# Patient Record
Sex: FEMALE | Race: WHITE | Employment: OTHER | URBAN - NONMETROPOLITAN AREA
[De-identification: names, ages, dates, MRNs, and addresses within clinical notes are randomized per-mention and may not be internally consistent; named-entity substitution may affect disease eponyms.]

---

## 2021-08-12 ENCOUNTER — HOSPITAL ENCOUNTER (OUTPATIENT)
Dept: OCCUPATIONAL THERAPY | Age: 76
Setting detail: THERAPIES SERIES
Discharge: HOME | End: 2021-08-12
Payer: MEDICARE

## 2021-08-12 PROCEDURE — 97165 OT EVAL LOW COMPLEX 30 MIN: CPT

## 2021-08-12 NOTE — PROGRESS NOTES
[address]  DRIVING EVALUATION    PATIENT: Jose Montgomery  YOB: 1945  GENDER:  female  CSN: [id number]   REFERRING PHYSICIAN:   JULIA Canales PA-C  DIAGNOSIS:  Altered mental status  DRIVING HISTORY:    Patient reports not having driven since April of this year. Diagnosed with a brain aneurysm per patient and  confirmed. Normally would drive 1x/wk in the area where she lives, occasionally to [facility].  is the principal  between the two. PMH: Please see medical history questionnaire for past medical history, allergies, and medications. OBJECTIVE  VISUAL SKILLS(using the [employer] Dream Heat 2000 Visual Evaluator)       FAR VISUAL ACUITY:   Pass. 20/40 R and L with glasses     STEREO DEPTH:   Pass. FUSION:    Pass. PERIPHERAL VISION:  Pass. Identified bilaterally at 55 and 70 degree angles       DYNAVISION TESTIN hits in 60 second self paced trial. [facility]. PHYSICAL SKILLS  RANGE OF MOTION:Within functional limits for driving  STRENGTH: Within functional limits for driving  TRANSFER IN/OUT OF SIMULATOR: Within functional limits for driving  AMBULATION: Within functional limits for driving    IN VEHICLE MANIPULATION SKILLS:  Steering Wheel:Within functional limits for driving   Gas Pedal:  Within functional limits for driving  Brake Pedal: Within functional limits for driving  Turn Signal: Not tested  Seat Belt: Not tested     COGNITIVE SKILLS  ORIENTATION:  Impaired: Oriented to month, stated year was . ATTENTION SPAN:Within functional limits for driving  FRUSTRATION TOLERANCE:Within functional limits for driving  IMPULSIVITY:Within functional limits for driving  DIRECTION FOLLOWING:Impaired: difficulty with higher than 1 step instruction. R/L DISCRIMINATION:Impaired: had occasional difficulty with verbalizing L/R.  MEMORY:Impaired: Short and long term memory impairment noted.   JUDGMENT: Within functional limits for driving    COGNITIVE TESTING:     SHORT BLESSED TEST:   The Short Blessed Test is a screening tool to assess orientation, short term memory, long term memory, and reversal of learning. Overall this patient received a score of  Ó10 or more which indicates a cognitive impairment. Patient demonstrated a score of 18, with difficulties noted with orientation, short term recall and reversal of learning. Trail Making Test A - Impaired. Unable to complete test in required time of  78 seconds. Required 93 seconds to complete. Clock Drawing Test - 1/4. SIMULATED DRIVING ASSESSMENT:  WARM-UP:    (54 MPH, [address] with several curves. Light on-coming traffic. There are no intersections, pedestrians, cross traffic, slow traffic, etc.)    Total off road crashes: 0 (Good performance is 0)   Total collisions with vehicles and roadway objects: 0 (Good performance is 0)   Percentage of time over the posted speed limit: 0 (Good performance is within +/- 5% of the posted speed limit.)   Percentage of time out of lanes: 0 (Good performance would be less than 5%, moderate is less than 10%, greater than 10% is considered poor.)      BRAKE REACTION TIME:  (The brake reaction time test consists of presenting a large stop sign in the center of the visual display.  The appropriate response if for the  to release the gas and apply the brakes as quickly as possible.)     Total pedal reaction time: 1 second (Average value is below 0.7 seconds.)   Gas pedal reaction time: 0.65 seconds (For good performance, this should be less than 0.4 seconds; poor performance is above 0.55 seconds)   Stopping distance: 185 feet (Good performance is less than 175 feet, moderate is between 175 and 220 feet, greater than 220 feet is considered poor.)   Speed at stimulus: 50 MPH        ASSESSMENT AND PLAN    RESULTS: Patient tested unsafe to return to independent driving    RECOMMENDATIONS:   No driving at this time due to diminished cognition including executive function, concentration, and short term memory. Patient has been instructed to contact referring physician to discuss results of this evaluation. Patient has been informed that his or her physician is responsible for making the final decision regarding driving status. Thank you for this referral.    History: Low Complexity: brief history completed. Reviewed medical and therapy records related to presenting problem    Performance Deficits/Examination: Low Complexity: 1-2 performance deficits related to presenting problem that result in activity limitations or participation restrictions. Deficits include: diminished cognitive ability. Clinical Decision Making: Clinical Decision making was of Low Complexity as the result of analysis of data from a problem focused assessment, a consideration of a limited number of treatment options, no significant comorbidities affecting the plan of care and no modification or assistance required to complete the evaluation. Evaluation Complexity: Based on the findings of patient history, examination, clinical presentation, and decision making during this evaluation, this patient is of low complexity.     Time in: 0920  Time out: 1015  Timed treatment: 0  Total time: 55 minutes          Pollo SANCHEZ/MUKUL, [place] #[id number]

## 2023-05-18 ENCOUNTER — HOSPITAL ENCOUNTER (OUTPATIENT)
Dept: MRI IMAGING | Age: 78
Discharge: HOME | End: 2023-05-18
Payer: MEDICARE

## 2023-05-18 DIAGNOSIS — I67.1 CEREBRAL ANEURYSM, NONRUPTURED: ICD-10-CM

## 2023-05-18 LAB — POC CREATININE WHOLE BLOOD: 0.7 MG/DL (ref 0.5–1.2)

## 2023-05-18 PROCEDURE — 70553 MRI BRAIN STEM W/O & W/DYE: CPT

## 2023-05-18 PROCEDURE — 70546 MR ANGIOGRAPH HEAD W/O&W/DYE: CPT

## 2023-05-18 PROCEDURE — 82565 ASSAY OF CREATININE: CPT

## 2023-05-18 PROCEDURE — A9579 GAD-BASE MR CONTRAST NOS,1ML: HCPCS | Performed by: NEUROLOGICAL SURGERY

## 2023-05-18 PROCEDURE — 6360000004 HC RX CONTRAST MEDICATION: Performed by: NEUROLOGICAL SURGERY

## 2023-05-18 RX ADMIN — GADOTERIDOL 10 ML: 279.3 INJECTION, SOLUTION INTRAVENOUS at 17:23

## 2023-06-05 ENCOUNTER — TELEPHONE (OUTPATIENT)
Dept: FAMILY MEDICINE CLINIC | Age: 78
End: 2023-06-05

## 2023-06-06 NOTE — TELEPHONE ENCOUNTER
I called the patient's spouse and let him know that Dr. Alexx Shaikh did receive the imaging (MRI and MRA) but has not received any notes from JULIA Flores as of yet. Patient's spouse stated he will be getting a hold of Dr. Margoth Earl office to have them send over their notes.

## 2023-06-06 NOTE — TELEPHONE ENCOUNTER
sent me a message in his own chart. Please let him know that I do have results of imaging (MRI and MRA) but I do not have the note from Dr. Matthew House.

## 2023-06-07 NOTE — TELEPHONE ENCOUNTER
Thank you! He sent another message in his chart. He is concerned about BP elevation when Gilbert Owens is agitated (given the carotid aneurysm). Would it be helpful to have a visit (in person or video visit) to discuss her blood pressures and her medications and her mood? We may be able to improve things a bit.

## 2023-06-07 NOTE — TELEPHONE ENCOUNTER
Called and spoke to patient's spouse, he stated Berta Marco [place] has an appointment coming up in September and he will monitor Vannesa's BP and if there are any issues or concerns then he will message Dr. Natasha Holman on myChart unless Dr. Destini Person thinks the patient should be seen sooner.

## 2023-06-08 ENCOUNTER — TELEPHONE (OUTPATIENT)
Dept: FAMILY MEDICINE CLINIC | Age: 78
End: 2023-06-08

## 2023-06-08 NOTE — TELEPHONE ENCOUNTER
Note from :    Dr Evita Yan,  Here is the average of three BP readings of Justin Funez. Diastolic: 224, Systolic 77, Pulse 85  Device used is a [employer] automated sphygmomanometer   Thank you for your follow up. Royce for Justin Funez    I sent message back that this is a good average.

## 2023-09-14 ENCOUNTER — OFFICE VISIT (OUTPATIENT)
Dept: FAMILY MEDICINE CLINIC | Age: 78
End: 2023-09-14
Payer: MEDICARE

## 2023-09-14 VITALS
BODY MASS INDEX: 29.39 KG/M2 | OXYGEN SATURATION: 97 % | HEART RATE: 76 BPM | WEIGHT: 145.8 LBS | SYSTOLIC BLOOD PRESSURE: 124 MMHG | DIASTOLIC BLOOD PRESSURE: 84 MMHG | HEIGHT: 59 IN

## 2023-09-14 DIAGNOSIS — Z91.81 AT HIGH RISK FOR FALLS: ICD-10-CM

## 2023-09-14 DIAGNOSIS — F03.B0 MODERATE DEMENTIA WITHOUT BEHAVIORAL DISTURBANCE, PSYCHOTIC DISTURBANCE, MOOD DISTURBANCE, OR ANXIETY, UNSPECIFIED DEMENTIA TYPE (HCC): Primary | ICD-10-CM

## 2023-09-14 PROCEDURE — 99213 OFFICE O/P EST LOW 20 MIN: CPT | Performed by: FAMILY MEDICINE

## 2023-09-14 PROCEDURE — 1123F ACP DISCUSS/DSCN MKR DOCD: CPT | Performed by: FAMILY MEDICINE

## 2023-09-14 RX ORDER — MEMANTINE HYDROCHLORIDE 5 MG/1
TABLET ORAL
COMMUNITY
Start: 2023-08-25

## 2023-09-14 RX ORDER — DONEPEZIL HYDROCHLORIDE 23 MG/1
TABLET, FILM COATED ORAL
COMMUNITY
Start: 2023-08-18

## 2023-09-14 RX ORDER — ACETAMINOPHEN 325 MG/1
325 TABLET ORAL DAILY
COMMUNITY

## 2023-09-14 SDOH — ECONOMIC STABILITY: HOUSING INSECURITY
IN THE LAST 12 MONTHS, WAS THERE A TIME WHEN YOU DID NOT HAVE A STEADY PLACE TO SLEEP OR SLEPT IN A SHELTER (INCLUDING NOW)?: NO

## 2023-09-14 SDOH — ECONOMIC STABILITY: FOOD INSECURITY: WITHIN THE PAST 12 MONTHS, YOU WORRIED THAT YOUR FOOD WOULD RUN OUT BEFORE YOU GOT MONEY TO BUY MORE.: NEVER TRUE

## 2023-09-14 SDOH — ECONOMIC STABILITY: FOOD INSECURITY: WITHIN THE PAST 12 MONTHS, THE FOOD YOU BOUGHT JUST DIDN'T LAST AND YOU DIDN'T HAVE MONEY TO GET MORE.: NEVER TRUE

## 2023-09-14 SDOH — ECONOMIC STABILITY: INCOME INSECURITY: HOW HARD IS IT FOR YOU TO PAY FOR THE VERY BASICS LIKE FOOD, HOUSING, MEDICAL CARE, AND HEATING?: NOT HARD AT ALL

## 2023-09-14 ASSESSMENT — ENCOUNTER SYMPTOMS
BACK PAIN: 1
TROUBLE SWALLOWING: 0
CONSTIPATION: 1
ABDOMINAL PAIN: 0
SHORTNESS OF BREATH: 0

## 2023-09-14 ASSESSMENT — PATIENT HEALTH QUESTIONNAIRE - PHQ9
SUM OF ALL RESPONSES TO PHQ9 QUESTIONS 1 & 2: 0
SUM OF ALL RESPONSES TO PHQ QUESTIONS 1-9: 0
2. FEELING DOWN, DEPRESSED OR HOPELESS: 0
SUM OF ALL RESPONSES TO PHQ QUESTIONS 1-9: 0
1. LITTLE INTEREST OR PLEASURE IN DOING THINGS: 0

## 2023-09-14 NOTE — PROGRESS NOTES
clearly but difficulty finding the correct words to convey intended meaning. No tremor. Psychiatric:         Mood and Affect: Mood normal.          No results found for any visits on 09/14/23. An electronic signature was used to authenticate this note.     --Preeti Mayo MD

## 2023-09-14 NOTE — PATIENT INSTRUCTIONS
Have labs prior to next visit. Drink plenty of water so it's not hard to get the blood sample. Try Blue Emu or diclofenac gel for the right arm pain.

## 2023-10-06 ENCOUNTER — TELEPHONE (OUTPATIENT)
Dept: FAMILY MEDICINE CLINIC | Age: 78
End: 2023-10-06

## 2023-10-09 NOTE — TELEPHONE ENCOUNTER
Certainly can consider cscope, but I recommend they try the change in the fiber dosing first to see if that regulates her BM's. If desired, we can refer to GI for further discussion of cscope. Her other conditions, especially her memory loss, need to be factored in to that decision.
I called and spoke to the pt's spouse Lia Johnson. Lia Johnson stated the pt has a hx of breast cancer first dx in 2016, Lia Johnson also states the pt has a family history (mother) of Liver, esophageal and Lower GI cancer. Lia Johnson stated the pt's last colonoscopy was 10+ years ago. Lia Johnson also stated the patient is taking metamucil fiber every other day, I let him know that Dr. Lashonda Aleman wants the pt to increase and continue daily and to have an adequate intake of water daily. Nitesh stated understanding!
Spoke to patients . Stated understanding.
Spoke with patient . States that patient has had recent bowl changes. Was doing better with fiber supplement. Then had some diarrhea one day. Then back again. Patient is wondering with hx of cancer if she should have a colonoscopy done.
When was last cscope? Hx of breast cancer, not colon cancer, correct? Still taking the fiber daily? If so, increase the dose and continue daily. Encourage adequate water intake.
negative...

## 2023-12-01 ENCOUNTER — TELEPHONE (OUTPATIENT)
Dept: FAMILY MEDICINE CLINIC | Age: 78
End: 2023-12-01

## 2023-12-01 NOTE — TELEPHONE ENCOUNTER
Pt's spouse called stating he believes the pt has a UTI, urine is cloudy, has a strong odor and frequency has increased and urgency to go, is confused more than normal and is going through her depends more. Alicia Vernon say's there is no pain or bleeding. Would like to know if it is okay to take her to the [facility] there in [place] on Monday?

## 2023-12-04 ENCOUNTER — OFFICE VISIT (OUTPATIENT)
Dept: FAMILY MEDICINE CLINIC | Age: 78
End: 2023-12-04
Payer: MEDICARE

## 2023-12-04 ENCOUNTER — TELEPHONE (OUTPATIENT)
Dept: FAMILY MEDICINE CLINIC | Age: 78
End: 2023-12-04

## 2023-12-04 VITALS
HEIGHT: 59 IN | SYSTOLIC BLOOD PRESSURE: 118 MMHG | WEIGHT: 147 LBS | BODY MASS INDEX: 29.64 KG/M2 | RESPIRATION RATE: 15 BRPM | OXYGEN SATURATION: 98 % | DIASTOLIC BLOOD PRESSURE: 68 MMHG | HEART RATE: 71 BPM

## 2023-12-04 DIAGNOSIS — R41.82 ALTERED MENTAL STATUS, UNSPECIFIED ALTERED MENTAL STATUS TYPE: Primary | ICD-10-CM

## 2023-12-04 DIAGNOSIS — R35.0 URINARY FREQUENCY: Primary | ICD-10-CM

## 2023-12-04 DIAGNOSIS — R41.82 ALTERED MENTAL STATUS, UNSPECIFIED ALTERED MENTAL STATUS TYPE: ICD-10-CM

## 2023-12-04 DIAGNOSIS — R35.0 URINARY FREQUENCY: ICD-10-CM

## 2023-12-04 PROBLEM — L90.5 SCAR CONDITIONS AND FIBROSIS OF SKIN: Status: ACTIVE | Noted: 2022-08-11

## 2023-12-04 PROBLEM — D05.12 INTRADUCTAL CARCINOMA IN SITU OF LEFT BREAST: Status: ACTIVE | Noted: 2023-12-04

## 2023-12-04 PROBLEM — R94.31 ABNORMAL ELECTROCARDIOGRAPHY: Status: ACTIVE | Noted: 2018-04-06

## 2023-12-04 PROBLEM — I49.8 ATRIAL ARRHYTHMIA: Status: ACTIVE | Noted: 2018-04-06

## 2023-12-04 PROBLEM — L73.8 OTHER SPECIFIED FOLLICULAR DISORDERS: Status: ACTIVE | Noted: 2022-02-11

## 2023-12-04 PROBLEM — R01.1 CARDIAC MURMUR, UNSPECIFIED: Status: ACTIVE | Noted: 2018-04-06

## 2023-12-04 PROBLEM — L82.1 OTHER SEBORRHEIC KERATOSIS: Status: ACTIVE | Noted: 2022-08-11

## 2023-12-04 PROBLEM — M85.80 OSTEOPENIA: Status: ACTIVE | Noted: 2021-05-25

## 2023-12-04 PROBLEM — L30.4 ERYTHEMA INTERTRIGO: Status: ACTIVE | Noted: 2022-08-11

## 2023-12-04 PROBLEM — I72.0 ANEURYSM OF CAROTID ARTERY (HCC): Status: ACTIVE | Noted: 2021-05-24

## 2023-12-04 PROBLEM — I67.1 INTRACRANIAL ANEURYSM: Status: ACTIVE | Noted: 2021-05-19

## 2023-12-04 PROBLEM — Z85.828 PERSONAL HISTORY OF OTHER MALIGNANT NEOPLASM OF SKIN: Status: ACTIVE | Noted: 2022-02-11

## 2023-12-04 PROCEDURE — 1123F ACP DISCUSS/DSCN MKR DOCD: CPT | Performed by: STUDENT IN AN ORGANIZED HEALTH CARE EDUCATION/TRAINING PROGRAM

## 2023-12-04 PROCEDURE — 99213 OFFICE O/P EST LOW 20 MIN: CPT | Performed by: STUDENT IN AN ORGANIZED HEALTH CARE EDUCATION/TRAINING PROGRAM

## 2023-12-04 RX ORDER — NITROFURANTOIN 25; 75 MG/1; MG/1
100 CAPSULE ORAL 2 TIMES DAILY
Qty: 10 CAPSULE | Refills: 0 | Status: SHIPPED | OUTPATIENT
Start: 2023-12-04 | End: 2023-12-09

## 2023-12-04 ASSESSMENT — ENCOUNTER SYMPTOMS
SINUS PAIN: 0
DIARRHEA: 0
RHINORRHEA: 0
COUGH: 0
SINUS PRESSURE: 0
COLOR CHANGE: 0
NAUSEA: 0
VOMITING: 0
SHORTNESS OF BREATH: 0
CONSTIPATION: 0
SORE THROAT: 0

## 2023-12-04 NOTE — TELEPHONE ENCOUNTER
Please let Mercy Dutton know that it is definitely a good idea to have urine checked in advance of appointment Thursday. And confirm that he is comfortable waiting until Thursday to have Shana Sowmya seen? I could see her tomorrow if he prefers. I will put in order for UA and cx.   Hope it is the correct order for collection at [facility] :)

## 2023-12-04 NOTE — PROGRESS NOTES
[address] [address] [address] Belén Pompa is a 69 y.o. female who presents today for:  Chief Complaint   Patient presents with    Urinary Tract Infection     Patients  states he has noticed symptoms of, strong order, cloudiness, increased frequency, urgency and confusion. Thinks it started about 1-2 weeks ago. Has been taking cranberry capsule        Assessment/Plan:     Dario Siddiqui was seen today for urinary tract infection. Diagnoses and all orders for this visit:    Urinary frequency  -     nitrofurantoin, macrocrystal-monohydrate, (MACROBID) 100 MG capsule; Take 1 capsule by mouth 2 times daily for 5 days  -     Urinalysis with Reflex to Culture    Altered mental status, unspecified altered mental status type  -     Urinalysis with Reflex to Culture      UTI symptoms with increased confusion, no red flag symptoms. Will treat with Macrobid as above, additionally has order placed for urinalysis with reflex culture and has been plans to bring in urine sample when able to obtain. No follow-ups on file. Medications Prescribed:  Orders Placed This Encounter   Medications    nitrofurantoin, macrocrystal-monohydrate, (MACROBID) 100 MG capsule     Sig: Take 1 capsule by mouth 2 times daily for 5 days     Dispense:  10 capsule     Refill:  0       Future Appointments   Date Time Provider [address] [place] [address]   12/7/2023  2:30 PM [facility]ee   3/12/2024  2:30 PM MD LESLEY Edouard [facility] Dee       HPI:     HPI  71-year-old female with past medical history significant for aneurysm of carotid artery, arrhythmia, murmur, dementia who presents for evaluation of UTI. Patient's  states that he has no symptoms of strong odor, cloudiness, increased frequency, urgency, and increased overall confusion for patient.     He thinks it may have started 1 to 2

## 2023-12-04 NOTE — TELEPHONE ENCOUNTER
I called and spoke with Jensen Lewis, he had Tyrone Torres at the [facility] and was trying to give a urine sample. Jensen Lewis had Dr. Parks Said get on the phone with me and she let me know that they currently could not get a urine sample from the pt but will treat the pt as if she has a UTI and stated Jensen Lewis can still take Tyrone Torres to have a urine done and if the pt is still showing symptoms of a UTI on Thursday then treatment will have already been started! Jensen Lewis will bring Tyrone Torres in to be seen with Dr. Catrachita Inman on Thursday 12/7/2023.

## 2023-12-04 NOTE — TELEPHONE ENCOUNTER
----- Message from Ramiro Murphy sent at 12/4/2023  9:26 AM EST -----  Subject: Message to Provider    QUESTIONS  Information for Provider? Patient spouse Lien Parker would like if clinical staff   could call back in regards if he should take patient to walk in clinic for   urine test to have before Thursday's visit  ---------------------------------------------------------------------------  --------------  Vahid Antimony Alexander  [phone]; OK to leave message on voicemail  ---------------------------------------------------------------------------  --------------  SCRIPT ANSWERS  Relationship to Patient? Spouse/Partner  Representative Name? Munira Andrews   Is the representative on the Communication Release of Information (JING)   form in Epic?  Yes

## 2023-12-05 LAB
BACTERIA URNS QL MICRO: ABNORMAL /HPF
BILIRUB UR QL STRIP.AUTO: NEGATIVE
CASTS #/AREA URNS LPF: ABNORMAL /LPF
CASTS 2: ABNORMAL /LPF
CHARACTER UR: CLEAR
COLOR: YELLOW
CRYSTALS URNS MICRO: ABNORMAL
EPITHELIAL CELLS, UA: ABNORMAL /HPF
GLUCOSE UR QL STRIP.AUTO: NEGATIVE MG/DL
HGB UR QL STRIP.AUTO: NEGATIVE
KETONES UR QL STRIP.AUTO: NEGATIVE
MISCELLANEOUS 2: ABNORMAL
NITRITE UR QL STRIP: POSITIVE
PH UR STRIP.AUTO: 6 [PH] (ref 5–9)
PROT UR STRIP.AUTO-MCNC: NEGATIVE MG/DL
RBC URINE: ABNORMAL /HPF
RENAL EPI CELLS #/AREA URNS HPF: ABNORMAL /[HPF]
SP GR UR REFRACT.AUTO: 1.02 (ref 1–1.03)
UROBILINOGEN, URINE: 0.2 EU/DL (ref 0–1)
WBC #/AREA URNS HPF: > 200 /HPF
WBC #/AREA URNS HPF: ABNORMAL /[HPF]
YEAST LIKE FUNGI URNS QL MICRO: ABNORMAL

## 2023-12-06 LAB
BACTERIA UR CULT: ABNORMAL
ORGANISM: ABNORMAL

## 2023-12-07 ENCOUNTER — OFFICE VISIT (OUTPATIENT)
Dept: FAMILY MEDICINE CLINIC | Age: 78
End: 2023-12-07
Payer: MEDICARE

## 2023-12-07 VITALS
DIASTOLIC BLOOD PRESSURE: 72 MMHG | BODY MASS INDEX: 30 KG/M2 | OXYGEN SATURATION: 97 % | WEIGHT: 148.8 LBS | TEMPERATURE: 98.4 F | HEART RATE: 66 BPM | SYSTOLIC BLOOD PRESSURE: 108 MMHG | HEIGHT: 59 IN

## 2023-12-07 DIAGNOSIS — K06.8 PAIN IN GUMS: ICD-10-CM

## 2023-12-07 DIAGNOSIS — N30.00 ACUTE CYSTITIS WITHOUT HEMATURIA: Primary | ICD-10-CM

## 2023-12-07 PROCEDURE — 1123F ACP DISCUSS/DSCN MKR DOCD: CPT | Performed by: FAMILY MEDICINE

## 2023-12-07 PROCEDURE — 99212 OFFICE O/P EST SF 10 MIN: CPT | Performed by: FAMILY MEDICINE

## 2023-12-07 NOTE — PROGRESS NOTES
97%     Physical Exam  Vitals reviewed. Constitutional:       Appearance: Normal appearance. Eyes:      Conjunctiva/sclera: Conjunctivae normal.   Cardiovascular:      Rate and Rhythm: Normal rate and regular rhythm. Heart sounds: Murmur (I/VI systolic murmur) heard. Pulmonary:      Effort: Pulmonary effort is normal.      Breath sounds: Normal breath sounds. No wheezing, rhonchi or rales. Abdominal:      General: Abdomen is flat. Palpations: Abdomen is soft. There is no mass. Tenderness: There is no abdominal tenderness. There is no guarding or rebound. Musculoskeletal:      Right lower leg: No edema. Left lower leg: No edema. Lymphadenopathy:      Cervical: No cervical adenopathy. Neurological:      Mental Status: She is alert. Comments: Disoriented. Speech clear. Gums without erythema or swelling. No oral thrush. No results found for any visits on 12/07/23. An electronic signature was used to authenticate this note.     --Edwina Johnston MD

## 2023-12-27 ENCOUNTER — OFFICE VISIT (OUTPATIENT)
Dept: FAMILY MEDICINE CLINIC | Age: 78
End: 2023-12-27
Payer: MEDICARE

## 2023-12-27 ENCOUNTER — TELEPHONE (OUTPATIENT)
Dept: FAMILY MEDICINE CLINIC | Age: 78
End: 2023-12-27

## 2023-12-27 VITALS
RESPIRATION RATE: 17 BRPM | HEIGHT: 59 IN | WEIGHT: 147 LBS | TEMPERATURE: 97.7 F | SYSTOLIC BLOOD PRESSURE: 124 MMHG | HEART RATE: 73 BPM | OXYGEN SATURATION: 95 % | BODY MASS INDEX: 29.64 KG/M2 | DIASTOLIC BLOOD PRESSURE: 86 MMHG

## 2023-12-27 DIAGNOSIS — R09.81 CONGESTION OF NASAL SINUS: Primary | ICD-10-CM

## 2023-12-27 DIAGNOSIS — U07.1 COVID: ICD-10-CM

## 2023-12-27 PROBLEM — F03.90 DEMENTIA (HCC): Status: ACTIVE | Noted: 2023-12-27

## 2023-12-27 LAB
Lab: ABNORMAL
QC PASS/FAIL: ABNORMAL
SARS-COV-2 RDRP RESP QL NAA+PROBE: POSITIVE

## 2023-12-27 PROCEDURE — 1123F ACP DISCUSS/DSCN MKR DOCD: CPT | Performed by: STUDENT IN AN ORGANIZED HEALTH CARE EDUCATION/TRAINING PROGRAM

## 2023-12-27 PROCEDURE — 99214 OFFICE O/P EST MOD 30 MIN: CPT | Performed by: STUDENT IN AN ORGANIZED HEALTH CARE EDUCATION/TRAINING PROGRAM

## 2023-12-27 PROCEDURE — 87635 SARS-COV-2 COVID-19 AMP PRB: CPT | Performed by: STUDENT IN AN ORGANIZED HEALTH CARE EDUCATION/TRAINING PROGRAM

## 2023-12-27 NOTE — PROGRESS NOTES
[address] [facility]  [address] Belén Lopez is a 69 y.o. female who presents today for:  Chief Complaint   Patient presents with    Congestion     Runny nose for 3 days, today has been congested, SOB  Denies fevers. Assessment/Plan:     Jeremie Angela was seen today for congestion. Diagnoses and all orders for this visit:    Congestion of nasal sinus  -     POCT COVID-19 Rapid, NAAT    COVID  -     nirmatrelvir/ritonavir 300/100 (PAXLOVID) 20 x 150 MG & 10 x 100MG TBPK; Take 3 tablets (two 150 mg nirmatrelvir and one 100 mg ritonavir tablets) by mouth every 12 hours for 5 days. COVID-positive, Paxlovid as above otherwise recommendations as below. Current CDC guidelines recommend isolation 5 days after the onset of symptoms. If symptoms improving at that time can return to work with mask for additional 5 days. If symptoms not improving at that time should remain isolated for 5 additional days. Please get pulse oximeter. If shortness of breath worsens or if oxygen is consistently below 90%, please go to the ED for further evaluation. Symptomatic therapy suggested: gargle for sore throat, use mist at bedside for congestion. Apply facial warm packs for sinus pain. Recommend increased hydration, small frequent meals, and resting when able. For cough/chest congestion either Mucinex DM or Robitussin DM, take dose as recommended on bottle. For drainage/post nasal drip Flonase 2 puffs per nostril nightly for two weeks. No follow-ups on file. Medications Prescribed:  Orders Placed This Encounter   Medications    nirmatrelvir/ritonavir 300/100 (PAXLOVID) 20 x 150 MG & 10 x 100MG TBPK     Sig: Take 3 tablets (two 150 mg nirmatrelvir and one 100 mg ritonavir tablets) by mouth every 12 hours for 5 days.      Dispense:  30 tablet     Refill:  0     Order Specific Question:   Does this

## 2023-12-27 NOTE — PATIENT INSTRUCTIONS
Current CDC guidelines recommend isolation 5 days after the onset of symptoms. If symptoms improving at that time can return to work with mask for additional 5 days. If symptoms not improving at that time should remain isolated for 5 additional days. Please get pulse oximeter. If shortness of breath worsens or if oxygen is consistently below 90%, please go to the ED for further evaluation. Symptomatic therapy suggested: gargle for sore throat, use mist at bedside for congestion. Apply facial warm packs for sinus pain. Recommend increased hydration, small frequent meals, and resting when able. For cough/chest congestion either Mucinex DM or Robitussin DM, take dose as recommended on bottle. For drainage/post nasal drip Flonase 2 puffs per nostril nightly for two weeks.

## 2023-12-27 NOTE — TELEPHONE ENCOUNTER
Patients EC called in stating, patient is having some difficulty breathing with this congestion and cough she has. In formed patient  Is not in today, and the schedule is full, Informed patients EC that she should be evaluated today, informed patient to do to UC or ER. Spoke with yue agrees with this. Spoke with patients  is seeing UC in [place].

## 2024-03-12 ENCOUNTER — OFFICE VISIT (OUTPATIENT)
Dept: FAMILY MEDICINE CLINIC | Age: 79
End: 2024-03-12
Payer: MEDICARE

## 2024-03-12 ENCOUNTER — NURSE ONLY (OUTPATIENT)
Dept: LAB | Age: 79
End: 2024-03-12

## 2024-03-12 VITALS
HEART RATE: 74 BPM | HEIGHT: 59 IN | OXYGEN SATURATION: 100 % | WEIGHT: 150 LBS | BODY MASS INDEX: 30.24 KG/M2 | DIASTOLIC BLOOD PRESSURE: 78 MMHG | SYSTOLIC BLOOD PRESSURE: 118 MMHG

## 2024-03-12 DIAGNOSIS — F03.90 DEMENTIA WITHOUT BEHAVIORAL DISTURBANCE, PSYCHOTIC DISTURBANCE, MOOD DISTURBANCE, OR ANXIETY, UNSPECIFIED DEMENTIA SEVERITY, UNSPECIFIED DEMENTIA TYPE (HCC): ICD-10-CM

## 2024-03-12 DIAGNOSIS — M85.80 OSTEOPENIA, UNSPECIFIED LOCATION: ICD-10-CM

## 2024-03-12 DIAGNOSIS — Z00.00 WELLNESS EXAMINATION: Primary | ICD-10-CM

## 2024-03-12 LAB
25(OH)D3 SERPL-MCNC: 38 NG/ML (ref 30–100)
ALBUMIN SERPL BCG-MCNC: 4 G/DL (ref 3.5–5.1)
ALP SERPL-CCNC: 119 U/L (ref 38–126)
ALT SERPL W/O P-5'-P-CCNC: 21 U/L (ref 11–66)
ANION GAP SERPL CALC-SCNC: 12 MEQ/L (ref 8–16)
AST SERPL-CCNC: 21 U/L (ref 5–40)
BASOPHILS ABSOLUTE: 0 THOU/MM3 (ref 0–0.1)
BASOPHILS NFR BLD AUTO: 0.8 %
BILIRUB SERPL-MCNC: 0.2 MG/DL (ref 0.3–1.2)
BUN SERPL-MCNC: 17 MG/DL (ref 7–22)
CALCIUM SERPL-MCNC: 9.5 MG/DL (ref 8.5–10.5)
CHLORIDE SERPL-SCNC: 100 MEQ/L (ref 98–111)
CO2 SERPL-SCNC: 25 MEQ/L (ref 23–33)
CREAT SERPL-MCNC: 0.6 MG/DL (ref 0.4–1.2)
DEPRECATED RDW RBC AUTO: 47.8 FL (ref 35–45)
EOSINOPHIL NFR BLD AUTO: 1.8 %
EOSINOPHILS ABSOLUTE: 0.1 THOU/MM3 (ref 0–0.4)
ERYTHROCYTE [DISTWIDTH] IN BLOOD BY AUTOMATED COUNT: 13.5 % (ref 11.5–14.5)
GFR SERPL CREATININE-BSD FRML MDRD: > 60 ML/MIN/1.73M2
GLUCOSE SERPL-MCNC: 110 MG/DL (ref 70–108)
HCT VFR BLD AUTO: 44.4 % (ref 37–47)
HGB BLD-MCNC: 14.2 GM/DL (ref 12–16)
IMM GRANULOCYTES # BLD AUTO: 0.03 THOU/MM3 (ref 0–0.07)
IMM GRANULOCYTES NFR BLD AUTO: 0.5 %
LYMPHOCYTES ABSOLUTE: 1.6 THOU/MM3 (ref 1–4.8)
LYMPHOCYTES NFR BLD AUTO: 25.5 %
MCH RBC QN AUTO: 30.6 PG (ref 26–33)
MCHC RBC AUTO-ENTMCNC: 32 GM/DL (ref 32.2–35.5)
MCV RBC AUTO: 95.7 FL (ref 81–99)
MONOCYTES ABSOLUTE: 0.6 THOU/MM3 (ref 0.4–1.3)
MONOCYTES NFR BLD AUTO: 10.1 %
NEUTROPHILS NFR BLD AUTO: 61.3 %
NRBC BLD AUTO-RTO: 0 /100 WBC
PLATELET # BLD AUTO: 256 THOU/MM3 (ref 130–400)
PMV BLD AUTO: 9.7 FL (ref 9.4–12.4)
POTASSIUM SERPL-SCNC: 4.2 MEQ/L (ref 3.5–5.2)
PROT SERPL-MCNC: 6.8 G/DL (ref 6.1–8)
RBC # BLD AUTO: 4.64 MILL/MM3 (ref 4.2–5.4)
SEGMENTED NEUTROPHILS ABSOLUTE COUNT: 3.8 THOU/MM3 (ref 1.8–7.7)
SODIUM SERPL-SCNC: 137 MEQ/L (ref 135–145)
TSH SERPL DL<=0.005 MIU/L-ACNC: 0.57 UIU/ML (ref 0.4–4.2)
WBC # BLD AUTO: 6.2 THOU/MM3 (ref 4.8–10.8)

## 2024-03-12 PROCEDURE — G0439 PPPS, SUBSEQ VISIT: HCPCS | Performed by: FAMILY MEDICINE

## 2024-03-12 PROCEDURE — 1123F ACP DISCUSS/DSCN MKR DOCD: CPT | Performed by: FAMILY MEDICINE

## 2024-03-12 ASSESSMENT — PATIENT HEALTH QUESTIONNAIRE - PHQ9
SUM OF ALL RESPONSES TO PHQ QUESTIONS 1-9: 0
SUM OF ALL RESPONSES TO PHQ QUESTIONS 1-9: 0
2. FEELING DOWN, DEPRESSED OR HOPELESS: 0
SUM OF ALL RESPONSES TO PHQ QUESTIONS 1-9: 0
SUM OF ALL RESPONSES TO PHQ QUESTIONS 1-9: 0
SUM OF ALL RESPONSES TO PHQ9 QUESTIONS 1 & 2: 0
1. LITTLE INTEREST OR PLEASURE IN DOING THINGS: 0

## 2024-03-12 NOTE — PROGRESS NOTES
[facility] PROFESSIONAL SERVS  [facility]  [address] [facility] [id number] [phone]    Vannesa Haas (:  1945) is a 78 y.o. female, here for evaluation of the following chief complaint(s):  Medication Check (Pt here for routine med check.  Does have new concerns for \"spitting up\" lately.   states after pt eats, she will sometimes spit up phlegm.  Has happened twice in the past few days.  Neurologist, CAILIN Mccallum did increase Namenda to twice daily. )           ASSESSMENT/PLAN:  1. Wellness examination  Gradually progressive cognitive decline.  Has living will.   assisting with ADL's, IADL's.  Has fallen.  Reviewed wellness info under AW[facility] rooming tab.  Gave printed info.  Recommended COVID booster.  Due for Prevnar 20 in .    2. Dementia without behavioral disturbance, psychotic disturbance, mood disturbance, or anxiety, unspecified dementia severity, unspecified dementia type (HCC)  Managed by neurologist.  Reviewed note from visit 2023.  - Comprehensive Metabolic Panel; Future  - CBC with Auto Differential; Future  - TSH with Reflex; Future  - Vitamin D 25 Hydroxy; Future    3. Osteopenia, unspecified location   reports normal heel u/s at [facility].  DXA  showed osteopenia.  Check vitamin D level.  Consider repeat DXA -  wants to wait for now.  We can discuss at next visit.    - Comprehensive Metabolic Panel; Future  - CBC with Auto Differential; Future  - TSH with Reflex; Future  - Vitamin D 25 Hydroxy; Future    4. Gagging and vomiting phlegm  My concern is dysphagia and aspiration, but the history is not classic.  Further testing if sx persist/worsen.      Return in about 6 months (around 2024).       Patient Instructions   Pneumonia vaccine due .    Go ahead and get the COVID booster.  Have labs checked today.      Subjective   SUBJECTIVE/OBJECTIVE:  Some gagging and regurgitation

## 2024-03-13 ENCOUNTER — TELEPHONE (OUTPATIENT)
Dept: FAMILY MEDICINE CLINIC | Age: 79
End: 2024-03-13

## 2024-03-13 NOTE — PROGRESS NOTES
Flor message 3/13/24 -  sent in his chart.    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  Not much content lots of phlegm again.  Royce     I replied asking if she had any gagging or if she had eaten or drank just before the episode.  Awaiting an answer.  Consider swallowing evaluation.

## 2024-03-13 NOTE — TELEPHONE ENCOUNTER
----- Message from Ellen Dasilva MD sent at 3/13/2024  1:16 PM EDT -----  Please let  know that Vannesa's vitamin D level is at the low end of normal.  I would recommend a daily vitamin D supplement (1000IU daily).  The rest of her labs are fine.

## 2024-03-13 NOTE — TELEPHONE ENCOUNTER
I called and spoke to the patient's EC, Isma. I let him know Dr. Ellen Dasilva's response regarding the patient's lab results. He voiced understanding.

## 2024-03-14 ENCOUNTER — TELEPHONE (OUTPATIENT)
Dept: FAMILY MEDICINE CLINIC | Age: 79
End: 2024-03-14

## 2024-03-14 NOTE — TELEPHONE ENCOUNTER
Mr. Haas is sending [employer] messages through his chart about Vannesa.  Can we help him get proxy access to her mychart?      Here is the latest message, a response to a message from yesterday:    \"No cough yes she had breakfast. We ate lunch  salad. We just finished a walk. She can not find the 1/2 bath . I have to tell her every time.   She talks gibberish to be and gets angry when I don’t answer!\"    Please let him know the following from me - I think we should talk about a swallowing study, but I would recommend we include Elizabeth in the conversation.  The biggest question in my mind is whether the results of the study would help in any way.  They might, by letting us know what is wrong, but if the issue is with swallowing, the options for improving that are somewhat limited.  Should we set up a video visit and include Elizabeth?    Please let him know that I appreciate his patience with Vannesa - I am sure it is so frustrating having to show her the same things over and over and having her get upset when she can't make herself understood.  It is hard on both of them.  Would he want to be part of a caregiver support group if we can identify one locally?

## 2024-03-14 NOTE — TELEPHONE ENCOUNTER
Called patients  and LMOM to call the office. I did find him in the system and added him to her my chart for now.

## 2024-06-06 ENCOUNTER — NURSE ONLY (OUTPATIENT)
Dept: FAMILY MEDICINE CLINIC | Age: 79
End: 2024-06-06

## 2024-06-06 ENCOUNTER — TELEPHONE (OUTPATIENT)
Dept: FAMILY MEDICINE CLINIC | Age: 79
End: 2024-06-06

## 2024-06-06 DIAGNOSIS — R30.0 DYSURIA: Primary | ICD-10-CM

## 2024-06-06 DIAGNOSIS — R30.0 DYSURIA: ICD-10-CM

## 2024-06-06 LAB
BACTERIA: ABNORMAL
BILIRUB UR QL STRIP: NEGATIVE
CASTS #/AREA URNS LPF: ABNORMAL /LPF
CASTS #/AREA URNS LPF: ABNORMAL /LPF
CHARACTER UR: ABNORMAL
CHARCOAL URNS QL MICRO: ABNORMAL
COLOR UR: YELLOW
CRYSTALS URNS QL MICRO: ABNORMAL
EPITHELIAL CELLS, UA: ABNORMAL /HPF
GLUCOSE UR QL STRIP.AUTO: NEGATIVE MG/DL
HGB UR QL STRIP.AUTO: ABNORMAL
KETONES UR QL STRIP.AUTO: NEGATIVE
LEUKOCYTE ESTERASE UR QL STRIP.AUTO: ABNORMAL
NITRITE UR QL STRIP.AUTO: POSITIVE
PH UR STRIP.AUTO: 6.5 [PH] (ref 5–9)
PROT UR STRIP.AUTO-MCNC: ABNORMAL MG/DL
RBC #/AREA URNS HPF: ABNORMAL /HPF
RENAL EPI CELLS #/AREA URNS HPF: ABNORMAL /[HPF]
SPECIFIC GRAVITY UA: 1.02 (ref 1–1.03)
UROBILINOGEN, URINE: 0.2 EU/DL (ref 0–1)
WBC #/AREA URNS HPF: > 200 /HPF
YEAST LIKE FUNGI URNS QL MICRO: ABNORMAL

## 2024-06-06 NOTE — TELEPHONE ENCOUNTER
The patient's EC, Isma called and said that he spoke to Ellen this morning about the patient. He would like for Dr. Dasilva to put in an order for a urine test and they will have it done at [facility] in [place]. He would like a call back when this has been completed.   [Parents] : parents [Medical Records] : medical records

## 2024-06-06 NOTE — TELEPHONE ENCOUNTER
I called and spoke to the patient's EC, Isma. I let him know that Dr. Ellen Dasilva put the order in. He voiced understanding.

## 2024-06-07 ENCOUNTER — TELEPHONE (OUTPATIENT)
Dept: FAMILY MEDICINE CLINIC | Age: 79
End: 2024-06-07

## 2024-06-07 DIAGNOSIS — N30.00 ACUTE CYSTITIS WITHOUT HEMATURIA: Primary | ICD-10-CM

## 2024-06-07 RX ORDER — NITROFURANTOIN 25; 75 MG/1; MG/1
100 CAPSULE ORAL 2 TIMES DAILY
Qty: 10 CAPSULE | Refills: 0 | Status: SHIPPED | OUTPATIENT
Start: 2024-06-07 | End: 2024-06-12

## 2024-06-07 NOTE — TELEPHONE ENCOUNTER
I called the patient's EC, Isma and received voicemail. I left a detailed message letting him know Dr. Ellen Dasilva's response regarding the urine results and that the medication was sent to [facility] in [place], [place] and if he has any questions or concerns he can give the office a call back.

## 2024-06-07 NOTE — TELEPHONE ENCOUNTER
I copy and pasted Marquita's response regarding the patient's lab results and sent them to the patient via My Chart.

## 2024-06-07 NOTE — TELEPHONE ENCOUNTER
----- Message from Ellen Dasilva MD sent at 6/7/2024  8:06 AM EDT -----    Please let  Royce know that urine does indicate infection.  I will send antibiotic to pharmacy.  Thank you!

## 2024-06-07 NOTE — RESULT ENCOUNTER NOTE
Please let  Royce know that urine does indicate infection.  I will send antibiotic to pharmacy.  Thank you!

## 2024-06-12 ENCOUNTER — HOSPITAL ENCOUNTER (OUTPATIENT)
Dept: MRI IMAGING | Age: 79
Discharge: HOME | End: 2024-06-12
Payer: MEDICARE

## 2024-06-12 DIAGNOSIS — I67.1 CEREBRAL ANEURYSM, NONRUPTURED: ICD-10-CM

## 2024-06-12 LAB — POC CREATININE WHOLE BLOOD: 0.8 MG/DL (ref 0.5–1.2)

## 2024-06-12 PROCEDURE — 70546 MR ANGIOGRAPH HEAD W/O&W/DYE: CPT

## 2024-06-12 PROCEDURE — 82565 ASSAY OF CREATININE: CPT

## 2024-06-12 PROCEDURE — 70553 MRI BRAIN STEM W/O & W/DYE: CPT

## 2024-06-12 PROCEDURE — 6360000004 HC RX CONTRAST MEDICATION: Performed by: NEUROLOGICAL SURGERY

## 2024-06-12 PROCEDURE — A9579 GAD-BASE MR CONTRAST NOS,1ML: HCPCS | Performed by: NEUROLOGICAL SURGERY

## 2024-06-12 RX ADMIN — GADOTERIDOL 15 ML: 279.3 INJECTION, SOLUTION INTRAVENOUS at 19:01

## 2024-08-22 ENCOUNTER — TELEPHONE (OUTPATIENT)
Dept: FAMILY MEDICINE CLINIC | Age: 79
End: 2024-08-22

## 2024-08-22 ENCOUNTER — LAB (OUTPATIENT)
Dept: FAMILY MEDICINE CLINIC | Age: 79
End: 2024-08-22

## 2024-08-22 DIAGNOSIS — R39.9 UTI SYMPTOMS: ICD-10-CM

## 2024-08-22 DIAGNOSIS — R39.9 UTI SYMPTOMS: Primary | ICD-10-CM

## 2024-08-22 LAB
BACTERIA: ABNORMAL
BILIRUB UR QL STRIP: NEGATIVE
CASTS #/AREA URNS LPF: ABNORMAL /LPF
CASTS #/AREA URNS LPF: ABNORMAL /LPF
CHARACTER UR: ABNORMAL
CHARCOAL URNS QL MICRO: ABNORMAL
COLOR UR: YELLOW
CRYSTALS URNS QL MICRO: ABNORMAL
EPITHELIAL CELLS, UA: ABNORMAL /HPF
GLUCOSE UR QL STRIP.AUTO: NEGATIVE MG/DL
HGB UR QL STRIP.AUTO: NEGATIVE
KETONES UR QL STRIP.AUTO: NEGATIVE
LEUKOCYTE ESTERASE UR QL STRIP.AUTO: ABNORMAL
NITRITE UR QL STRIP.AUTO: NEGATIVE
PH UR STRIP.AUTO: 6.5 [PH] (ref 5–9)
PROT UR STRIP.AUTO-MCNC: NEGATIVE MG/DL
RBC #/AREA URNS HPF: ABNORMAL /HPF
RENAL EPI CELLS #/AREA URNS HPF: ABNORMAL /[HPF]
SPECIFIC GRAVITY UA: 1.02 (ref 1–1.03)
UROBILINOGEN, URINE: 0.2 EU/DL (ref 0–1)
WBC #/AREA URNS HPF: ABNORMAL /HPF
YEAST LIKE FUNGI URNS QL MICRO: ABNORMAL

## 2024-08-22 NOTE — TELEPHONE ENCOUNTER
Patient ROSALIND Isma called in stating he believe patient has a uti. Would like and order placed to have a urine test done. Would like to have that done at [place].

## 2024-08-22 NOTE — TELEPHONE ENCOUNTER
I called Isma to let him know Dr. Dasilva placed orders for Vannesa to have urine tested to see if she has a UTI or not. No answer left a detailed VM!

## 2024-08-23 ENCOUNTER — TELEPHONE (OUTPATIENT)
Dept: FAMILY MEDICINE CLINIC | Age: 79
End: 2024-08-23

## 2024-08-23 NOTE — TELEPHONE ENCOUNTER
----- Message from Dr. Ellen Dasilva MD sent at 8/23/2024  8:47 AM EDT -----  Please let  know that initial urine test does not look very suspicious for infection, but we will have more definitive results by Monday (culture is in progress).  If she needs evaluated for her symptoms in the mean time, we may be able to get her an appointment in the [facility] office.  Thanks!  AKILA

## 2024-08-23 NOTE — TELEPHONE ENCOUNTER
I called Royce back and let him know that Dr. Dasilva stated she would prefer to wait on sending in any prescriptions until she has the culture results. Royce stated understanding and will wait.

## 2024-08-23 NOTE — TELEPHONE ENCOUNTER
Given the relatively normal result, I would prefer to wait on a prescription until we have the culture result.

## 2024-08-23 NOTE — TELEPHONE ENCOUNTER
I called Royce and let him know Dr. Dasilva's response to the urine test results. Royce stated understanding and will wait until Monday to see what the cultures say. He is asking if Dr. Dasilva feels a prescription should be called in for Vannesa, if so he would like the prescription sent to [facility] in [place] please!

## 2024-08-24 LAB
BACTERIA UR CULT: ABNORMAL
ORGANISM: ABNORMAL

## 2024-08-26 ENCOUNTER — TELEPHONE (OUTPATIENT)
Dept: FAMILY MEDICINE CLINIC | Age: 79
End: 2024-08-26

## 2024-08-26 NOTE — TELEPHONE ENCOUNTER
I copy and pasted the response regarding the patient's urine results and sent them to the patient via My Chart.

## 2024-08-26 NOTE — TELEPHONE ENCOUNTER
----- Message from Dr. Ellen Dasilva MD sent at 8/26/2024 12:58 PM EDT -----  Please let Mr. Haas know that urine culture did not show infection.  I can see Vannesa in the office if needed to discuss any symptoms of concern.

## 2024-08-28 ENCOUNTER — TELEPHONE (OUTPATIENT)
Dept: FAMILY MEDICINE CLINIC | Age: 79
End: 2024-08-28

## 2024-08-28 NOTE — TELEPHONE ENCOUNTER
Isma called the office and scheduled Vannesa to be seen with Dr. Dasilva on 09/05/2024 and would like if any lab work that needs to be done be put in prior to Vannesa's appointment and he'll have Vannesa get them done in [place].

## 2024-08-29 NOTE — TELEPHONE ENCOUNTER
Vannesa had normal basic labs 3/2024.  I would prefer to see her and decide at the visit if other labs would be useful depending on her current status and concerns.  Please let Royce know.  Thanks!

## 2024-08-29 NOTE — TELEPHONE ENCOUNTER
I called Isma to let him know Dr. Dasilva's response regarding the requested lab orders. No answer left a detailed VM!

## 2024-09-05 ENCOUNTER — OFFICE VISIT (OUTPATIENT)
Dept: FAMILY MEDICINE CLINIC | Age: 79
End: 2024-09-05
Payer: MEDICARE

## 2024-09-05 VITALS
BODY MASS INDEX: 30.86 KG/M2 | DIASTOLIC BLOOD PRESSURE: 84 MMHG | HEART RATE: 75 BPM | SYSTOLIC BLOOD PRESSURE: 122 MMHG | WEIGHT: 152.8 LBS | OXYGEN SATURATION: 95 %

## 2024-09-05 DIAGNOSIS — R60.0 EDEMA OF LEFT LOWER EXTREMITY: ICD-10-CM

## 2024-09-05 DIAGNOSIS — R73.03 PRE-DIABETES: ICD-10-CM

## 2024-09-05 DIAGNOSIS — F03.B0 MODERATE DEMENTIA WITHOUT BEHAVIORAL DISTURBANCE, PSYCHOTIC DISTURBANCE, MOOD DISTURBANCE, OR ANXIETY, UNSPECIFIED DEMENTIA TYPE (HCC): ICD-10-CM

## 2024-09-05 DIAGNOSIS — N39.41 URGE INCONTINENCE OF URINE: Primary | ICD-10-CM

## 2024-09-05 DIAGNOSIS — N32.81 OVERACTIVE BLADDER: ICD-10-CM

## 2024-09-05 PROBLEM — D05.12 INTRADUCTAL CARCINOMA IN SITU OF LEFT BREAST: Status: RESOLVED | Noted: 2023-12-04 | Resolved: 2024-09-05

## 2024-09-05 PROBLEM — L90.5 SCAR CONDITIONS AND FIBROSIS OF SKIN: Status: RESOLVED | Noted: 2022-08-11 | Resolved: 2024-09-05

## 2024-09-05 PROBLEM — L30.4 ERYTHEMA INTERTRIGO: Status: RESOLVED | Noted: 2022-08-11 | Resolved: 2024-09-05

## 2024-09-05 PROBLEM — R94.31 ABNORMAL ELECTROCARDIOGRAPHY: Status: RESOLVED | Noted: 2018-04-06 | Resolved: 2024-09-05

## 2024-09-05 PROCEDURE — 1123F ACP DISCUSS/DSCN MKR DOCD: CPT | Performed by: FAMILY MEDICINE

## 2024-09-05 PROCEDURE — 99214 OFFICE O/P EST MOD 30 MIN: CPT | Performed by: FAMILY MEDICINE

## 2024-09-05 RX ORDER — ASPIRIN/CALCIUM/MAG/ALUMINUM 325 MG
325 TABLET ORAL DAILY
COMMUNITY
End: 2024-09-06 | Stop reason: ALTCHOICE

## 2024-09-05 RX ORDER — VIT C/B6/B5/MAGNESIUM/HERB 173 50-5-6-5MG
CAPSULE ORAL DAILY
COMMUNITY

## 2024-09-05 RX ORDER — MIRABEGRON 25 MG/1
25 TABLET, FILM COATED, EXTENDED RELEASE ORAL DAILY
Qty: 30 TABLET | Refills: 0 | Status: SHIPPED | OUTPATIENT
Start: 2024-09-05

## 2024-09-05 NOTE — PROGRESS NOTES
[facility] PROFESSIONAL SERVS  [facility]  [address] [id number] [phone]    Zina Haas (:  1945) is a 79 y.o. female, here for evaluation of the following chief complaint(s):  6 Month Follow-Up        Assessment & Plan   ASSESSMENT/PLAN:  There are no diagnoses linked to this encounter.    No follow-ups on file.       There are no Patient Instructions on file for this visit.    Subjective   SUBJECTIVE/OBJECTIVE:  HPI    Review of Systems     Health Maintenance Due   Topic Date Due   • Hepatitis C screen  Never done   • Respiratory Syncytial Virus (RSV) Pregnant or age 60 yrs+ (1 - 1-dose 60+ series) Never done   • Shingles vaccine (2 of 3) 10/01/2009   • Flu vaccine (1) 2024   • COVID-19 Vaccine ( season) 2024       Objective   Vitals:    24 1324   BP: 122/84   Pulse: 75   SpO2: 95%     Physical Exam     No results found for any visits on 24.          {Time Documentation Optional:[id number]}      An electronic signature was used to authenticate this note.    --Ellen Dasilva MD     
  [facility]  [address] [facility] [id number] [phone]    Zina Haas (:  1945) is a 79 y.o. female, here for evaluation of the following chief complaint(s):  6 Month Follow-Up        Assessment & Plan   ASSESSMENT/PLAN:  1. Urge incontinence of urine  Recent urine cx neg.  Trial mirabegron.  Avoid anticholinergic meds due to co-morbid dementia.    - mirabegron (MYRBETRIQ) 25 MG TB24; Take 1 tablet by mouth daily  Dispense: 30 tablet; Refill: 0  - CBC with Auto Differential; Future  - Comprehensive Metabolic Panel; Future  - TSH with Reflex; Future  - Hemoglobin A1C; Future    2. Overactive bladder  As above.  - mirabegron (MYRBETRIQ) 25 MG TB24; Take 1 tablet by mouth daily  Dispense: 30 tablet; Refill: 0  - CBC with Auto Differential; Future  - Comprehensive Metabolic Panel; Future  - TSH with Reflex; Future  - Hemoglobin A1C; Future    3. Edema of left lower extremity  Concern for DVT.  Check u/s.  - Vascular duplex lower extremity venous left; Future  - CBC with Auto Differential; Future  - Comprehensive Metabolic Panel; Future  - TSH with Reflex; Future  - Hemoglobin A1C; Future    4. Moderate dementia without behavioral disturbance, psychotic disturbance, mood disturbance, or anxiety, unspecified dementia type (HCC)  Continued gradual decline.  Continue current meds.  Continue day program.  - CBC with Auto Differential; Future  - Comprehensive Metabolic Panel; Future  - TSH with Reflex; Future  - Hemoglobin A1C; Future    5. Pre-diabetes  Check A1C with next labs.  - Hemoglobin A1C; Future      Follow up  with labs prior to visit.         There are no Patient Instructions on file for this visit.    Subjective   SUBJECTIVE/OBJECTIVE:  Management visit.  Overall doing about the same as previous.  No falls.  No sig issues with swallowing, vomiting.  Urinary frequency.  Sig urinary incontinence - ?urge incontinence. Up 
Oriented - self; Oriented - place; Oriented - time

## 2024-09-06 DIAGNOSIS — I82.412 ACUTE DEEP VEIN THROMBOSIS (DVT) OF FEMORAL VEIN OF LEFT LOWER EXTREMITY (HCC): Primary | ICD-10-CM

## 2024-09-06 RX ORDER — MULTIVIT-MIN/IRON/FOLIC ACID/K 18-600-40
CAPSULE ORAL DAILY
COMMUNITY

## 2024-09-06 NOTE — PROGRESS NOTES
Received call from u/s dept at [facility].  Pt with acute dvt left mid superficial femoral vein extending to popliteal vein and one peroneal vein.  Partly compressible.    Spoke with .  Already took asa today.  Go ahead with apixaban anticoagulation.  Discontinue aspirin as of tomorrow.  Elevate LE.  Discussed need for at least 3 months of anticoagulation - likely longer as this appears unprovoked.    Plan follow up visit in 2-4 wks, sooner if needed.

## 2024-10-23 ENCOUNTER — TELEPHONE (OUTPATIENT)
Dept: FAMILY MEDICINE CLINIC | Age: 79
End: 2024-10-23

## 2024-10-23 NOTE — TELEPHONE ENCOUNTER
stopped in.  It has been 6 weeks from previous Vascular test and he is wondering if it is time to have another one.  If so, an order needs placed and faxed to [facility] so patient can have done at [facility].  Please advise.  Thank you.

## 2024-10-23 NOTE — TELEPHONE ENCOUNTER
I prefer to see her and evaluate - she may not need a repeat u/s if we can tell clinically that the clot has resolved.  Also important to see her to evaluate for any adverse effects from the blood thinner and to discuss timing for discontinuation.    Thanks!  AKILA

## 2024-10-30 ENCOUNTER — OFFICE VISIT (OUTPATIENT)
Dept: FAMILY MEDICINE CLINIC | Age: 79
End: 2024-10-30

## 2024-10-30 VITALS
HEART RATE: 70 BPM | OXYGEN SATURATION: 98 % | SYSTOLIC BLOOD PRESSURE: 126 MMHG | RESPIRATION RATE: 16 BRPM | BODY MASS INDEX: 31.39 KG/M2 | DIASTOLIC BLOOD PRESSURE: 78 MMHG | WEIGHT: 155.4 LBS

## 2024-10-30 DIAGNOSIS — R39.9 UTI SYMPTOMS: Primary | ICD-10-CM

## 2024-10-30 DIAGNOSIS — N30.00 ACUTE CYSTITIS WITHOUT HEMATURIA: ICD-10-CM

## 2024-10-30 LAB
BILIRUBIN, POC: NEGATIVE
BLOOD URINE, POC: NEGATIVE
CLARITY, POC: NORMAL
COLOR, POC: NORMAL
GLUCOSE URINE, POC: NEGATIVE MG/DL
KETONES, POC: NEGATIVE MG/DL
LEUKOCYTE EST, POC: NORMAL
NITRITE, POC: NEGATIVE
PH, POC: 7
PROTEIN, POC: NEGATIVE MG/DL
SPECIFIC GRAVITY, POC: 1.01
UROBILINOGEN, POC: 0.2 MG/DL

## 2024-10-30 RX ORDER — NITROFURANTOIN 25; 75 MG/1; MG/1
100 CAPSULE ORAL 2 TIMES DAILY
Qty: 10 CAPSULE | Refills: 0 | Status: SHIPPED | OUTPATIENT
Start: 2024-10-30 | End: 2024-11-04

## 2024-10-30 SDOH — ECONOMIC STABILITY: INCOME INSECURITY: HOW HARD IS IT FOR YOU TO PAY FOR THE VERY BASICS LIKE FOOD, HOUSING, MEDICAL CARE, AND HEATING?: NOT HARD AT ALL

## 2024-10-30 SDOH — ECONOMIC STABILITY: FOOD INSECURITY: WITHIN THE PAST 12 MONTHS, THE FOOD YOU BOUGHT JUST DIDN'T LAST AND YOU DIDN'T HAVE MONEY TO GET MORE.: NEVER TRUE

## 2024-10-30 SDOH — ECONOMIC STABILITY: FOOD INSECURITY: WITHIN THE PAST 12 MONTHS, YOU WORRIED THAT YOUR FOOD WOULD RUN OUT BEFORE YOU GOT MONEY TO BUY MORE.: NEVER TRUE

## 2024-10-30 ASSESSMENT — ENCOUNTER SYMPTOMS
COUGH: 0
VOMITING: 0
CONSTIPATION: 0
DIARRHEA: 1
NAUSEA: 0
SHORTNESS OF BREATH: 0

## 2024-10-30 NOTE — PROGRESS NOTES
Bowel sounds are normal. There is no distension.      Palpations: Abdomen is soft.      Tenderness: There is no abdominal tenderness. There is no right CVA tenderness, left CVA tenderness or guarding.   Neurological:      Mental Status: She is alert.           Patient given educational materials - see patient instructions.  Discussed use, benefit, and sideeffects of prescribed medications.  All patient questions answered.  Pt voiced understanding. Reviewed health maintenance.  Instructed to continue current medications, diet and exercise.  Patient agreed with treatment plan.Follow up as directed.     Electronically signed by Elda Mcneill MD on 10/30/2024 at 3:06 PM

## 2024-11-01 LAB
BACTERIA UR CULT: ABNORMAL
ORGANISM: ABNORMAL

## 2024-11-05 ENCOUNTER — TELEPHONE (OUTPATIENT)
Dept: FAMILY MEDICINE CLINIC | Age: 79
End: 2024-11-05

## 2024-11-05 ENCOUNTER — OFFICE VISIT (OUTPATIENT)
Dept: FAMILY MEDICINE CLINIC | Age: 79
End: 2024-11-05

## 2024-11-05 VITALS
BODY MASS INDEX: 31.41 KG/M2 | HEIGHT: 59 IN | WEIGHT: 155.8 LBS | SYSTOLIC BLOOD PRESSURE: 136 MMHG | DIASTOLIC BLOOD PRESSURE: 70 MMHG | OXYGEN SATURATION: 94 % | HEART RATE: 75 BPM

## 2024-11-05 DIAGNOSIS — R60.0 EDEMA OF LEFT LOWER EXTREMITY: ICD-10-CM

## 2024-11-05 DIAGNOSIS — I82.4Y2 DVT, LOWER EXTREMITY, PROXIMAL, ACUTE, LEFT (HCC): Primary | ICD-10-CM

## 2024-11-05 DIAGNOSIS — R19.7 DIARRHEA, UNSPECIFIED TYPE: ICD-10-CM

## 2024-11-05 NOTE — PROGRESS NOTES
[facility] PROFESSIONAL SERVS  [facility]  [address] [address]  [phone]    Zina Haas (:  1945) is a 79 y.o. female, here for evaluation of the following chief complaint(s):  Follow-up (Here to f/u for blood cots that were present in Lt leg,  states there were 3.  Has been on Eliquis since clots were initially found.)        Assessment & Plan     ASSESSMENT/PLAN:  1. DVT, lower extremity, proximal, acute, left (HCC)  Unprovoked blood clot.  The patient's condition was thoroughly discussed, including the risks and benefits of anticoagulation therapy. The current guidelines for managing unprovoked blood clots were also reviewed. Continuation of Eliquis 5 mg twice a day for a minimum of 3 months was recommended. Will likely continue indefinitely since clot was unprovoked.  She was advised that it is okay to take Eliquis at bedtime and does not need to be taken with food. No signs of abnormal bleeding were reported.     2. Diarrhea, unspecified type  The patient has experienced diarrhea, which may be related to recent antibiotic use. She was advised to add a fiber supplement such as Metamucil, Benefiber, or Citrucel to her diet to help manage her symptoms. She was instructed to start with a small amount to avoid bloating or gas.      3. Urinary Tract Infection (UTI).  The patient completed her antibiotic course with the last pill taken yesterday. Symptoms of dark urine and odor have subsided. No further antibiotics are needed at this time.    4. Weight gain.  The patient's weight has been gradually increasing. She was advised to monitor her diet and try to maintain her current weight to avoid additional strain on her knee.    5. Medication Management.  The patient's Namenda dosage was recently increased by her neurologist to 20 mg daily, taken as 10 mg in the morning and 10 mg at night. She was advised that there is no conflict between

## 2024-11-05 NOTE — TELEPHONE ENCOUNTER
----- Message from Dr. Elda Mcneill MD sent at 11/4/2024  8:03 AM EST -----  Please let patient's  know that she was on the right antibiotic to treat her infection. Please let us know if other questions, otherwise she has appointment with Dr. Dasilva tomorrow.

## 2024-11-05 NOTE — PATIENT INSTRUCTIONS
For loose, urgent stools:  start with 1/4-1/2 recommended dose over the counter fiber supplement.  Use daily.  Drink plenty of water.  Gradually increase dose every 3 days or so until stools are formed.  Benefiber, Metamucil, Citracel are some options

## 2025-01-20 NOTE — TELEPHONE ENCOUNTER
calling in to get short supply of eliquis until follow up appointment with you on 2/6/25    Has only til wed morning left     Patient's last appointment was : 11/5/2024  Patient's next appointment is : 2/6/2025  Last refilled: 11/5/24

## 2025-01-23 ENCOUNTER — OFFICE VISIT (OUTPATIENT)
Dept: FAMILY MEDICINE CLINIC | Age: 80
End: 2025-01-23

## 2025-01-23 ENCOUNTER — TELEPHONE (OUTPATIENT)
Dept: FAMILY MEDICINE CLINIC | Age: 80
End: 2025-01-23

## 2025-01-23 VITALS
OXYGEN SATURATION: 98 % | BODY MASS INDEX: 30.36 KG/M2 | HEIGHT: 59 IN | DIASTOLIC BLOOD PRESSURE: 80 MMHG | WEIGHT: 150.6 LBS | SYSTOLIC BLOOD PRESSURE: 132 MMHG | HEART RATE: 82 BPM

## 2025-01-23 DIAGNOSIS — R15.9 FULL INCONTINENCE OF FECES: Primary | ICD-10-CM

## 2025-01-23 DIAGNOSIS — R19.7 DIARRHEA, UNSPECIFIED TYPE: ICD-10-CM

## 2025-01-23 DIAGNOSIS — F03.B0 MODERATE DEMENTIA WITHOUT BEHAVIORAL DISTURBANCE, PSYCHOTIC DISTURBANCE, MOOD DISTURBANCE, OR ANXIETY, UNSPECIFIED DEMENTIA TYPE (HCC): ICD-10-CM

## 2025-01-23 DIAGNOSIS — I82.402 DEEP VEIN THROMBOSIS (DVT) OF LEFT LOWER EXTREMITY, UNSPECIFIED CHRONICITY, UNSPECIFIED VEIN (HCC): ICD-10-CM

## 2025-01-23 SDOH — ECONOMIC STABILITY: FOOD INSECURITY: WITHIN THE PAST 12 MONTHS, THE FOOD YOU BOUGHT JUST DIDN'T LAST AND YOU DIDN'T HAVE MONEY TO GET MORE.: NEVER TRUE

## 2025-01-23 SDOH — ECONOMIC STABILITY: FOOD INSECURITY: WITHIN THE PAST 12 MONTHS, YOU WORRIED THAT YOUR FOOD WOULD RUN OUT BEFORE YOU GOT MONEY TO BUY MORE.: NEVER TRUE

## 2025-01-23 ASSESSMENT — PATIENT HEALTH QUESTIONNAIRE - PHQ9
SUM OF ALL RESPONSES TO PHQ9 QUESTIONS 1 & 2: 0
SUM OF ALL RESPONSES TO PHQ QUESTIONS 1-9: 0
2. FEELING DOWN, DEPRESSED OR HOPELESS: NOT AT ALL
SUM OF ALL RESPONSES TO PHQ QUESTIONS 1-9: 0
1. LITTLE INTEREST OR PLEASURE IN DOING THINGS: NOT AT ALL

## 2025-01-23 NOTE — PROGRESS NOTES
[facility] PROFESSIONAL SERVS  [facility]  [address] [id number] [phone]    Zina Haas (:  1945) is a 79 y.o. female, here for evaluation of the following chief complaint(s):  Diarrhea ( states that he feels pt's diarrhea has worsened since starting Eliquis.  Started 2 packs of Benefiber a day which has made stools more formed at times.  States is happening daily.  Has been incontinent of her bowels. )        Assessment & Plan     ASSESSMENT/PLAN:  1. Full incontinence of feces  My concern is that this is a consequence of her dementia.  Will work to address any reversible causes.  Continue scheduled toileting regimen.  Check KUB to evaluate for constipation that could be contributing.  I reviewed the Bananatrol supplement -  has replaced the fiber supplement with this, but it does not have much fiber.  Recommend he add back the fiber supplement.  OK to continue the Bananatrol.    - XR ABDOMEN (KUB) (SINGLE AP VIEW); Future    2. Diarrhea, unspecified type  The stool is described as \"mushy and sometimes gross\", resembling oatmeal, but not explosive. She experiences bowel incontinence daily, often unaware of the need to defecate. This could be due to a lack of understanding or sensing the need to use the bathroom. An abdominal x-ray will be ordered to rule out constipation as a potential cause of her symptoms. If the x-ray shows constipation, appropriate treatment will be initiated.  We discussed stool pathogens testing, but not covered by Medicare for this indication and fairly costly.  Hold off for now.      3. Moderate dementia without behavioral disturbance, psychotic disturbance, mood disturbance, or anxiety, unspecified dementia type (HCC)  Continued decline.  Sleep more consistent with melatonin.   is primary caregiver, but with supportive family.  Not  clearly benefiting from ongoing memantine, donepezil treatment -

## 2025-01-24 ENCOUNTER — TELEPHONE (OUTPATIENT)
Dept: FAMILY MEDICINE CLINIC | Age: 80
End: 2025-01-24

## 2025-01-24 DIAGNOSIS — R15.9 FREQUENT FECAL INCONTINENCE: ICD-10-CM

## 2025-01-24 DIAGNOSIS — R19.7 DIARRHEA, UNSPECIFIED TYPE: Primary | ICD-10-CM

## 2025-01-24 NOTE — TELEPHONE ENCOUNTER
Please let Royce () know that the Xray did show some constipation.  I did speak with his daughter about that result and about the stool concerns.      Here are my recommendations for now -   1 - resume fiber supplement.  The Banatrol that she is taking is fine to continue, but it is a good idea to use extra fiber with that.  Start with just one packet and can increase to two packets daily after a week as long as she is tolerating.    2 - continue regular timed bathroom visits to help her empty on the toilet    With regard to the Eliquis for her blood clot/blood clot prevention, the test for the blood clot was done 9/6/24.  We want to keep at the current dose of Eliquis for 6 months, so until the beginning of March.  After that, we can decrease to 1/2 of the dose.  His daughter will cut the tablets in half when we get to that point, that way we can order a 90 day supply of the current dose for affordability sake.      I am fine with discontinuation of Namenda at some point - it may not be providing much benefit.  We can discuss that further at next visit.      I do still want to see Vannesa back in February so we can continue to work on addressing these concerns.    Thanks!  AKILA

## 2025-01-25 PROBLEM — I82.402 DEEP VEIN THROMBOSIS (DVT) OF LEFT LOWER EXTREMITY (HCC): Status: ACTIVE | Noted: 2025-01-25

## 2025-02-12 SDOH — HEALTH STABILITY: PHYSICAL HEALTH: ON AVERAGE, HOW MANY DAYS PER WEEK DO YOU ENGAGE IN MODERATE TO STRENUOUS EXERCISE (LIKE A BRISK WALK)?: 5 DAYS

## 2025-02-12 ASSESSMENT — PATIENT HEALTH QUESTIONNAIRE - PHQ9
2. FEELING DOWN, DEPRESSED OR HOPELESS: NOT AT ALL
SUM OF ALL RESPONSES TO PHQ QUESTIONS 1-9: 0
1. LITTLE INTEREST OR PLEASURE IN DOING THINGS: NOT AT ALL
SUM OF ALL RESPONSES TO PHQ9 QUESTIONS 1 & 2: 0

## 2025-02-12 ASSESSMENT — LIFESTYLE VARIABLES
HOW MANY STANDARD DRINKS CONTAINING ALCOHOL DO YOU HAVE ON A TYPICAL DAY: 1
HOW MANY STANDARD DRINKS CONTAINING ALCOHOL DO YOU HAVE ON A TYPICAL DAY: 1 OR 2
HOW OFTEN DO YOU HAVE SIX OR MORE DRINKS ON ONE OCCASION: 1
HOW OFTEN DO YOU HAVE A DRINK CONTAINING ALCOHOL: MONTHLY OR LESS
HOW OFTEN DO YOU HAVE A DRINK CONTAINING ALCOHOL: 2

## 2025-02-13 ENCOUNTER — OFFICE VISIT (OUTPATIENT)
Dept: FAMILY MEDICINE CLINIC | Age: 80
End: 2025-02-13

## 2025-02-13 VITALS
DIASTOLIC BLOOD PRESSURE: 80 MMHG | BODY MASS INDEX: 30.96 KG/M2 | HEIGHT: 59 IN | WEIGHT: 153.6 LBS | SYSTOLIC BLOOD PRESSURE: 118 MMHG | HEART RATE: 78 BPM | OXYGEN SATURATION: 95 %

## 2025-02-13 DIAGNOSIS — Z86.718 HX OF DEEP VENOUS THROMBOSIS: ICD-10-CM

## 2025-02-13 DIAGNOSIS — Z00.00 MEDICARE ANNUAL WELLNESS VISIT, SUBSEQUENT: Primary | ICD-10-CM

## 2025-02-13 DIAGNOSIS — F03.B0 MODERATE DEMENTIA WITHOUT BEHAVIORAL DISTURBANCE, PSYCHOTIC DISTURBANCE, MOOD DISTURBANCE, OR ANXIETY, UNSPECIFIED DEMENTIA TYPE (HCC): ICD-10-CM

## 2025-02-13 DIAGNOSIS — R15.9 INCONTINENCE OF FECES, UNSPECIFIED FECAL INCONTINENCE TYPE: ICD-10-CM

## 2025-02-13 NOTE — PROGRESS NOTES
[facility]  [address] [facility] [address]  [phone]    Zina Haas (:  1945) is a 79 y.o. female, here for evaluation of the following chief complaint(s):  Medicare AWV (Pt here for f/u from last visit and Medicare Wellness.   states stools have gotten better - still using the banana based powder.  Still having some occasional incontinence of bowels but are not as loose.y)        Assessment & Plan     ASSESSMENT/PLAN:  1. Medicare annual wellness visit, subsequent  See additional note.  Her weight has been fluctuating slightly. She is advised to verify her influenza vaccination status at [facility]. If she has not received the vaccine, it is recommended that she do so. She is encouraged to maintain a balanced diet and avoid overeating.    2. Moderate dementia without behavioral disturbance, psychotic disturbance, mood disturbance, or anxiety, unspecified dementia type (HCC)  Continue current management.  Family has discussed discontinuation of memantine at some point due to lack of ongoing benefit.  Follow for now.  No behavior or mood issues reported.  Sleeping better with melatonin.    3. Incontinence of feces, unspecified fecal incontinence type  Likely due to progression of dementia.  Doing better with more formed stools.  No change made today.    4. Hx of deep venous thrombosis  Apixaban 2.5mg BID indefinitely given unprovoked Dvt.    Assessment & Plan    Follow-up  The patient will follow up in 4 months, or earlier if necessary.    Return in about 4 months (around 2025).             Subjective   SUBJECTIVE/OBJECTIVE:  History of Present Illness  The patient presents for a wellness visit.    History is reported by , Royce, in the presence of the patient.  She has been experiencing more solid stools, still having occasional accidents. These incidents are managed with the use of hospital wipes. She has been 
Yes Jen Dumont MD   turmeric 500 MG CAPS Take by mouth daily Yes Jen Dumont MD   Multiple Vitamins-Minerals (PRESERVISION AREDS PO) Take by mouth daily Yes Jen Dumont MD   Multiple Vitamins-Minerals (CENTRUM SILVER PO) Take by mouth daily Yes Jen Dumont MD   Handicap Placard MISC by Does not apply route For 5 years Yes Ellen Dasilva MD   Donepezil HCl (ARICEPT) 23 MG TABS tablet 1 tablet nightly Yes Jen Dumont MD   memantine (NAMENDA) 5 MG tablet 2 times daily 2 tablets BID equalling 20mg total per day. Yes Jen Dumont MD       CareTeam (Including outside providers/suppliers regularly involved in providing care):   Patient Care Team:  Ellen Dasilva MD as PCP - General (Family Medicine)  Ellen Dasilva MD as PCP - Empaneled Provider     Recommendations for Preventive Services Due: see orders and patient instructions/AVS.  Recommended screening schedule for the next 5-10 years is provided to the patient in written form: see Patient Instructions/AVS.     Reviewed and updated this visit:  Tobacco  Allergies  Meds

## 2025-02-14 PROBLEM — Z86.718 HX OF DEEP VENOUS THROMBOSIS: Status: ACTIVE | Noted: 2025-02-14

## 2025-02-14 PROBLEM — R15.9 INCONTINENCE OF FECES: Status: ACTIVE | Noted: 2025-02-14

## 2025-02-25 ENCOUNTER — OFFICE VISIT (OUTPATIENT)
Dept: FAMILY MEDICINE CLINIC | Age: 80
End: 2025-02-25
Payer: MEDICARE

## 2025-02-25 VITALS
BODY MASS INDEX: 31.25 KG/M2 | OXYGEN SATURATION: 95 % | SYSTOLIC BLOOD PRESSURE: 124 MMHG | DIASTOLIC BLOOD PRESSURE: 72 MMHG | WEIGHT: 155 LBS | HEIGHT: 59 IN | HEART RATE: 78 BPM

## 2025-02-25 DIAGNOSIS — R39.9 UTI SYMPTOMS: Primary | ICD-10-CM

## 2025-02-25 LAB
BILIRUBIN, POC: NEGATIVE
BLOOD URINE, POC: NORMAL
CLARITY, POC: NORMAL
COLOR, POC: YELLOW
GLUCOSE URINE, POC: NEGATIVE MG/DL
KETONES, POC: NEGATIVE MG/DL
LEUKOCYTE EST, POC: NORMAL
NITRITE, POC: NEGATIVE
PH, POC: 7
PROTEIN, POC: NEGATIVE MG/DL
SPECIFIC GRAVITY, POC: 1.02
UROBILINOGEN, POC: 0.2 MG/DL

## 2025-02-25 PROCEDURE — 99213 OFFICE O/P EST LOW 20 MIN: CPT | Performed by: FAMILY MEDICINE

## 2025-02-25 PROCEDURE — 1159F MED LIST DOCD IN RCRD: CPT | Performed by: FAMILY MEDICINE

## 2025-02-25 PROCEDURE — 81002 URINALYSIS NONAUTO W/O SCOPE: CPT | Performed by: FAMILY MEDICINE

## 2025-02-25 PROCEDURE — 1123F ACP DISCUSS/DSCN MKR DOCD: CPT | Performed by: FAMILY MEDICINE

## 2025-02-25 NOTE — PROGRESS NOTES
[facility] PROFESSIONAL [facility] MEDICINE PRACTICE  [address] [address]  [phone]    Zina Haas (:  1945) is a 79 y.o. female, here for evaluation of the following chief complaint(s):  Urinary Frequency ( states pt's urine has been more concentrated with a strong odor.  Tested at home last night and showed positive nitrites and leukocytes. )        Assessment & Plan     ASSESSMENT/PLAN:  1. UTI symptoms    - POCT Urinalysis no Micro  - Culture, Urine    Assessment & Plan  Possible urinary tract infection.  The urine test showed some leukocytes but no nitrites. Given the absence of symptoms, the current guidelines suggest not treating asymptomatic urinary tract infections with antibiotics. However the pt's memory loss makes an accurate history difficult, and the  has concerns about urosepsis. A urine culture will be conducted to identify any potential bacterial growth. Treatment will be determined based on the culture results.    PROCEDURE  The patient has a history of appendectomy due to severe appendicitis, which resulted in a rupture.    No follow-ups on file.       There are no Patient Instructions on file for this visit.      Subjective   SUBJECTIVE/OBJECTIVE:  History of Present Illness  The patient presents for evaluation of a suspected urinary tract infection.    History is reported by  in the presence of the patient.   noted strong smelling urine, so an otc qualitative test was conducted last night, revealing leukocytes and nitrites. She has not exhibited any feverish symptoms or increased urinary frequency. Urinary incontinence at baseline.  She does not report any pain during urination. There have been no observed changes in her behavior or signs of confusion, apart from her pre-existing dementia. Two nights ago, at 1:00 AM, she got up and left a white watery trail of urine from the bed all the way to

## 2025-02-28 ENCOUNTER — TELEPHONE (OUTPATIENT)
Dept: FAMILY MEDICINE CLINIC | Age: 80
End: 2025-02-28

## 2025-02-28 LAB
BACTERIA UR CULT: ABNORMAL
ORGANISM: ABNORMAL

## 2025-02-28 NOTE — RESULT ENCOUNTER NOTE
Please let Royce () know that the urine culture did show bacteria.  Is Vannesa having any symptoms of UTI or any increase in confusion?  If not, best practice is to hold off on antibiotic for now.  Please let me know his thoughts/response.    ThanksAKILA

## 2025-02-28 NOTE — TELEPHONE ENCOUNTER
----- Message from Dr. Ellen Dasilva MD sent at 2/28/2025  8:08 AM EST -----  Please let Royce () know that the urine culture did show bacteria.  Is Vannesa having any symptoms of UTI or any increase in confusion?  If not, best practice is to hold off on antibiotic for now.  Please let me know his thoughts/response.    Thanks,  AKILA

## 2025-03-18 ENCOUNTER — LAB (OUTPATIENT)
Dept: FAMILY MEDICINE CLINIC | Age: 80
End: 2025-03-18
Payer: MEDICARE

## 2025-03-18 ENCOUNTER — TELEPHONE (OUTPATIENT)
Dept: FAMILY MEDICINE CLINIC | Age: 80
End: 2025-03-18

## 2025-03-18 DIAGNOSIS — N30.00 ACUTE CYSTITIS WITHOUT HEMATURIA: ICD-10-CM

## 2025-03-18 DIAGNOSIS — R35.0 URINARY FREQUENCY: Primary | ICD-10-CM

## 2025-03-18 DIAGNOSIS — R35.0 FREQUENCY OF URINATION: Primary | ICD-10-CM

## 2025-03-18 LAB
BILIRUBIN, POC: NEGATIVE
BLOOD URINE, POC: NEGATIVE
CLARITY, POC: NORMAL
COLOR, POC: YELLOW
GLUCOSE URINE, POC: NEGATIVE MG/DL
KETONES, POC: NEGATIVE MG/DL
LEUKOCYTE EST, POC: NORMAL
NITRITE, POC: NEGATIVE
PH, POC: 6
PROTEIN, POC: NORMAL MG/DL
SPECIFIC GRAVITY, POC: 1.01
UROBILINOGEN, POC: 0.2 MG/DL

## 2025-03-18 PROCEDURE — 81003 URINALYSIS AUTO W/O SCOPE: CPT | Performed by: FAMILY MEDICINE

## 2025-03-18 RX ORDER — CEPHALEXIN 500 MG/1
500 CAPSULE ORAL 3 TIMES DAILY
Qty: 21 CAPSULE | Refills: 0 | Status: SHIPPED | OUTPATIENT
Start: 2025-03-18 | End: 2025-03-25

## 2025-03-18 RX ORDER — CEPHALEXIN 500 MG/1
500 CAPSULE ORAL 3 TIMES DAILY
Qty: 21 CAPSULE | Refills: 0 | Status: SHIPPED | OUTPATIENT
Start: 2025-03-18 | End: 2025-03-18

## 2025-03-18 NOTE — PROGRESS NOTES
Pt  dropped off urine sample from home.  States pt has been having urinary frequency, increase in confusion, and cloudy urine.  Results in chart.

## 2025-03-18 NOTE — PROGRESS NOTES
Pt with worsening UTI sx - low grade fever, vomited x 1, dysuria.  UA c/w infection.  Urine cx pending.  Hx pcn allergy as a child, \"rash.\"  Discussed with  - given systemic sx, would prefer to use something other than macrobid.  Rx cephalexin.  He will watch for rash and if any concerns discontinue antibiotic and call the office.  To ED if worsening UTI/infectious sx.

## 2025-03-19 NOTE — TELEPHONE ENCOUNTER
Called by  evening 3/18/25 - pt having low grade fever, vomited x 1, sig dysuria.  UA done earlier in the day for urinary frequency.  Sent for cx.  Given worsening sx, rec treatment.  Hx pcn allergy from childhood, \"rash.\"  Macrobid not ideal for treatment given systemic sx.  Discussed with , rec treatment with cephalexin despite pcn allergy.  He will watch for rash or allergy sx.  Rx sent.

## 2025-03-20 ENCOUNTER — RESULTS FOLLOW-UP (OUTPATIENT)
Dept: FAMILY MEDICINE CLINIC | Age: 80
End: 2025-03-20

## 2025-03-20 LAB
BACTERIA UR CULT: ABNORMAL
ORGANISM: ABNORMAL

## 2025-03-20 NOTE — RESULT ENCOUNTER NOTE
Please let  know that urine culture grew E. Coli.  The cephalexin she is on will be effective for that bacteria.  How is she feeling?  Was having low grade fever and vomiting.    Thanks!  AKILA

## 2025-04-10 ENCOUNTER — OFFICE VISIT (OUTPATIENT)
Dept: FAMILY MEDICINE CLINIC | Age: 80
End: 2025-04-10
Payer: MEDICARE

## 2025-04-10 VITALS
HEART RATE: 86 BPM | BODY MASS INDEX: 31.71 KG/M2 | SYSTOLIC BLOOD PRESSURE: 100 MMHG | WEIGHT: 157 LBS | DIASTOLIC BLOOD PRESSURE: 72 MMHG | RESPIRATION RATE: 18 BRPM

## 2025-04-10 DIAGNOSIS — G30.1 MODERATE LATE ONSET ALZHEIMER'S DEMENTIA WITH AGITATION (HCC): ICD-10-CM

## 2025-04-10 DIAGNOSIS — F02.B11 MODERATE LATE ONSET ALZHEIMER'S DEMENTIA WITH AGITATION (HCC): ICD-10-CM

## 2025-04-10 DIAGNOSIS — R10.30 INGUINAL PAIN, UNSPECIFIED LATERALITY: Primary | ICD-10-CM

## 2025-04-10 DIAGNOSIS — B35.6 TINEA CRURIS: ICD-10-CM

## 2025-04-10 LAB
BILIRUBIN, POC: NEGATIVE
BLOOD URINE, POC: NEGATIVE
CLARITY, POC: CLEAR
COLOR, POC: YELLOW
GLUCOSE URINE, POC: NEGATIVE MG/DL
KETONES, POC: NEGATIVE MG/DL
LEUKOCYTE EST, POC: NORMAL
NITRITE, POC: NEGATIVE
PH, POC: 6.5
PROTEIN, POC: NEGATIVE MG/DL
SPECIFIC GRAVITY, POC: 1.01
UROBILINOGEN, POC: 0.2 MG/DL

## 2025-04-10 PROCEDURE — 1159F MED LIST DOCD IN RCRD: CPT | Performed by: FAMILY MEDICINE

## 2025-04-10 PROCEDURE — 99214 OFFICE O/P EST MOD 30 MIN: CPT | Performed by: FAMILY MEDICINE

## 2025-04-10 PROCEDURE — 1123F ACP DISCUSS/DSCN MKR DOCD: CPT | Performed by: FAMILY MEDICINE

## 2025-04-10 PROCEDURE — 81003 URINALYSIS AUTO W/O SCOPE: CPT | Performed by: FAMILY MEDICINE

## 2025-04-10 RX ORDER — CLOTRIMAZOLE 1 %
CREAM (GRAM) TOPICAL
Qty: 60 G | Refills: 0 | Status: SHIPPED | OUTPATIENT
Start: 2025-04-10 | End: 2025-04-17

## 2025-04-10 NOTE — PROGRESS NOTES
[facility] PROFESSIONAL SERVS  [facility]  [address] [facility] [address]  [phone]    Zina Haas (:  1945) is a 79 y.o. female, here for evaluation of the following chief complaint(s):  Groin Pain (On and off x 1 week /Does not hurt when she is urinating / stated last time this happened it was a UTI )        Assessment & Plan     ASSESSMENT/PLAN:  1. Inguinal pain, unspecified laterality  Sx not suggestive of UTI.  No prolapse or hernia on exam.  Address tinea cruris and follow for now.  - POCT Urinalysis No Micro (Auto)    2. Tinea cruris  - clotrimazole (LOTRIMIN AF) 1 % cream; Apply topically 2 times daily.  Dispense: 60 g; Refill: 0    3. 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    - Currently on Namenda and Aricept.  - Continued monitoring of cognitive function and behavioral symptoms is recommended.  - The potential use of olanzapine was discussed, including its benefits and risks, particularly the increased mortality risk in elderly patients.  - Further consultation with Elizabeth will be conducted to explore other possible treatment options before finalizing the decision on olanzapine.  - A follow-up will be provided within the next week regarding the medication plan.  - Emphasized the importance of comfort measures and managing anxiety and fears effectively.  - rec check labs to eval for any reversible causes for her sx - ordered    Assessment & Plan  1. Vaginal pain.  - Upon examination, no evidence of prolapse was observed. However, there was noted skin irritation.  - A prescription for a topical cream will be issued to alleviate the irritation. The cream should be applied twice daily.  - Advised to continue the current regimen of zinc oxide and cortisone 10 as needed.  - The prescription will be sent to [facility].            No follow-ups on file.       There are no Patient

## 2025-04-14 ENCOUNTER — TELEPHONE (OUTPATIENT)
Dept: FAMILY MEDICINE CLINIC | Age: 80
End: 2025-04-14

## 2025-04-14 NOTE — TELEPHONE ENCOUNTER
Please let  Royce know that prior to adding any medications for agitation, I would like to do some blood tests.  This way if there is an issue contributing to her sx we can address that.  I put orders in.  No need to have her fast.    Thanks!  AKILA

## 2025-04-16 ENCOUNTER — LAB (OUTPATIENT)
Dept: FAMILY MEDICINE CLINIC | Age: 80
End: 2025-04-16
Payer: MEDICARE

## 2025-04-16 DIAGNOSIS — N39.41 URGE INCONTINENCE OF URINE: ICD-10-CM

## 2025-04-16 DIAGNOSIS — R10.30 INGUINAL PAIN, UNSPECIFIED LATERALITY: ICD-10-CM

## 2025-04-16 DIAGNOSIS — G30.1 MODERATE LATE ONSET ALZHEIMER'S DEMENTIA WITH AGITATION (HCC): ICD-10-CM

## 2025-04-16 DIAGNOSIS — R73.03 PRE-DIABETES: ICD-10-CM

## 2025-04-16 DIAGNOSIS — F03.B0 MODERATE DEMENTIA WITHOUT BEHAVIORAL DISTURBANCE, PSYCHOTIC DISTURBANCE, MOOD DISTURBANCE, OR ANXIETY, UNSPECIFIED DEMENTIA TYPE (HCC): ICD-10-CM

## 2025-04-16 DIAGNOSIS — R60.0 EDEMA OF LEFT LOWER EXTREMITY: ICD-10-CM

## 2025-04-16 DIAGNOSIS — N32.81 OVERACTIVE BLADDER: ICD-10-CM

## 2025-04-16 DIAGNOSIS — F02.B11 MODERATE LATE ONSET ALZHEIMER'S DEMENTIA WITH AGITATION (HCC): ICD-10-CM

## 2025-04-16 LAB
ALBUMIN SERPL BCG-MCNC: 3.9 G/DL (ref 3.4–4.9)
ALP SERPL-CCNC: 120 U/L (ref 38–126)
ALT SERPL W/O P-5'-P-CCNC: 22 U/L (ref 10–35)
ANION GAP SERPL CALC-SCNC: 12 MEQ/L (ref 8–16)
AST SERPL-CCNC: 21 U/L (ref 10–35)
BASOPHILS ABSOLUTE: 0 THOU/MM3 (ref 0–0.1)
BASOPHILS NFR BLD AUTO: 0.7 %
BILIRUB SERPL-MCNC: 0.3 MG/DL (ref 0.3–1.2)
BUN SERPL-MCNC: 10 MG/DL (ref 8–23)
CALCIUM SERPL-MCNC: 9.1 MG/DL (ref 8.8–10.2)
CHLORIDE SERPL-SCNC: 101 MEQ/L (ref 98–111)
CO2 SERPL-SCNC: 26 MEQ/L (ref 22–29)
CREAT SERPL-MCNC: 0.7 MG/DL (ref 0.5–0.9)
DEPRECATED MEAN GLUCOSE BLD GHB EST-ACNC: 111 MG/DL (ref 70–126)
DEPRECATED RDW RBC AUTO: 44.1 FL (ref 35–45)
EOSINOPHIL NFR BLD AUTO: 1.7 %
EOSINOPHILS ABSOLUTE: 0.1 THOU/MM3 (ref 0–0.4)
ERYTHROCYTE [DISTWIDTH] IN BLOOD BY AUTOMATED COUNT: 12.8 % (ref 11.5–14.5)
GFR SERPL CREATININE-BSD FRML MDRD: 88 ML/MIN/1.73M2
GLUCOSE SERPL-MCNC: 176 MG/DL (ref 74–109)
HBA1C MFR BLD HPLC: 5.7 % (ref 4–6)
HCT VFR BLD AUTO: 44.6 % (ref 37–47)
HGB BLD-MCNC: 14.3 GM/DL (ref 12–16)
IMM GRANULOCYTES # BLD AUTO: 0.02 THOU/MM3 (ref 0–0.07)
IMM GRANULOCYTES NFR BLD AUTO: 0.3 %
LYMPHOCYTES ABSOLUTE: 1.6 THOU/MM3 (ref 1–4.8)
LYMPHOCYTES NFR BLD AUTO: 27.7 %
MCH RBC QN AUTO: 30.1 PG (ref 26–33)
MCHC RBC AUTO-ENTMCNC: 32.1 GM/DL (ref 32.2–35.5)
MCV RBC AUTO: 93.9 FL (ref 81–99)
MONOCYTES ABSOLUTE: 0.6 THOU/MM3 (ref 0.4–1.3)
MONOCYTES NFR BLD AUTO: 10.1 %
NEUTROPHILS ABSOLUTE: 3.5 THOU/MM3 (ref 1.8–7.7)
NEUTROPHILS NFR BLD AUTO: 59.5 %
NRBC BLD AUTO-RTO: 0 /100 WBC
PLATELET # BLD AUTO: 241 THOU/MM3 (ref 130–400)
PMV BLD AUTO: 8.9 FL (ref 9.4–12.4)
POTASSIUM SERPL-SCNC: 4.1 MEQ/L (ref 3.5–5.2)
PROT SERPL-MCNC: 6.6 G/DL (ref 6.4–8.3)
RBC # BLD AUTO: 4.75 MILL/MM3 (ref 4.2–5.4)
SODIUM SERPL-SCNC: 139 MEQ/L (ref 135–145)
TSH SERPL DL<=0.05 MIU/L-ACNC: 0.84 UIU/ML (ref 0.27–4.2)
WBC # BLD AUTO: 5.8 THOU/MM3 (ref 4.8–10.8)

## 2025-04-16 PROCEDURE — 36415 COLL VENOUS BLD VENIPUNCTURE: CPT | Performed by: STUDENT IN AN ORGANIZED HEALTH CARE EDUCATION/TRAINING PROGRAM

## 2025-04-17 ENCOUNTER — RESULTS FOLLOW-UP (OUTPATIENT)
Dept: FAMILY MEDICINE CLINIC | Age: 80
End: 2025-04-17

## 2025-04-18 DIAGNOSIS — F03.911 AGITATION DUE TO DEMENTIA (HCC): Primary | ICD-10-CM

## 2025-04-18 RX ORDER — QUETIAPINE FUMARATE 25 MG/1
12.5 TABLET, FILM COATED ORAL
Qty: 30 TABLET | Refills: 0 | Status: SHIPPED | OUTPATIENT
Start: 2025-04-18

## 2025-05-01 ENCOUNTER — TELEPHONE (OUTPATIENT)
Dept: FAMILY MEDICINE CLINIC | Age: 80
End: 2025-05-01

## 2025-05-01 NOTE — TELEPHONE ENCOUNTER
called.  Patient is not having any more symptoms/pain after using recently prescribed cream.  Should she continue using or can this cream be stopped.  Please advise.   would like a call back.  Thank you.

## 2025-05-01 NOTE — TELEPHONE ENCOUNTER
Left voicemail with , who identifies himself on his voicemail and gave approval previously to leave a message.  Relayed message.  Thank you.

## 2025-05-06 ENCOUNTER — OFFICE VISIT (OUTPATIENT)
Dept: FAMILY MEDICINE CLINIC | Age: 80
End: 2025-05-06
Payer: MEDICARE

## 2025-05-06 VITALS
HEART RATE: 84 BPM | BODY MASS INDEX: 32.68 KG/M2 | WEIGHT: 161.8 LBS | SYSTOLIC BLOOD PRESSURE: 128 MMHG | RESPIRATION RATE: 18 BRPM | DIASTOLIC BLOOD PRESSURE: 76 MMHG | OXYGEN SATURATION: 98 %

## 2025-05-06 DIAGNOSIS — L30.0 NUMMULAR ECZEMA: ICD-10-CM

## 2025-05-06 DIAGNOSIS — G30.1 MODERATE LATE ONSET ALZHEIMER'S DEMENTIA WITH AGITATION (HCC): ICD-10-CM

## 2025-05-06 DIAGNOSIS — M79.89 BILATERAL HAND SWELLING: ICD-10-CM

## 2025-05-06 DIAGNOSIS — R60.0 LEG EDEMA: Primary | ICD-10-CM

## 2025-05-06 DIAGNOSIS — F02.B11 MODERATE LATE ONSET ALZHEIMER'S DEMENTIA WITH AGITATION (HCC): ICD-10-CM

## 2025-05-06 PROCEDURE — 1123F ACP DISCUSS/DSCN MKR DOCD: CPT | Performed by: FAMILY MEDICINE

## 2025-05-06 PROCEDURE — 81002 URINALYSIS NONAUTO W/O SCOPE: CPT | Performed by: FAMILY MEDICINE

## 2025-05-06 PROCEDURE — 1159F MED LIST DOCD IN RCRD: CPT | Performed by: FAMILY MEDICINE

## 2025-05-06 PROCEDURE — 99214 OFFICE O/P EST MOD 30 MIN: CPT | Performed by: FAMILY MEDICINE

## 2025-05-06 RX ORDER — CLOTRIMAZOLE 1 %
CREAM (GRAM) TOPICAL 2 TIMES DAILY
COMMUNITY
Start: 2025-04-10

## 2025-05-06 RX ORDER — TRIAMCINOLONE ACETONIDE 1 MG/G
CREAM TOPICAL
Qty: 30 G | Refills: 1 | Status: SHIPPED | OUTPATIENT
Start: 2025-05-06

## 2025-05-06 NOTE — PROGRESS NOTES
[facility] PROFESSIONAL SERVS  [facility]  [address] [address]  Dept: [phone]  Dept Fax: [phone]  Loc: [phone]      Zina Haas is a 79 y.o. female who presents todayfor Swelling (Hands and legs /X 1 week )      :   HPI    Pt presents with  for concerns of swelling of the hands and lower legs.     Pt has a Hx of dementia--  provided pertinent history.     Hand swelling began first noting that he struggled to remove her wedding band and other rings from her fingers. He noticed it moreso in the joints. Today the left hand appears smaller than it was and the right hand joints are still large. As a whole he notes that both hands have reduced in size since yesterday.     Leg swelling is more recent.  notes that right side appears more swollen than the left. 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     Pt was recently started on Quetiapine (April 19) at night for sleep. Unsure if this could be contributing.      denies pt having any increased work of breathing, trouble breathing while laying down, or chest pain.       patient is allergic to penicillins, sulfamethoxazole-trimethoprim, levofloxacin, and meperidine.    Past MedicalHistory  Zina  has a past medical history of Abnormal electrocardiography, Erythema intertrigo, Intraductal carcinoma in situ of left breast, and Scar conditions and fibrosis of skin.    Past Surgical History  The patient  has no past surgical history on file.    Family History  This patient's family history is not on file.    Social History  Zina  reports that she has never smoked. She has never been exposed to tobacco smoke. She has never used smokeless tobacco.    Medications    Current Outpatient Medications:     triamcinolone (KENALOG) 0.1 % cream, Apply topically 2 times daily to rash on back - use for up to 10 days., Disp: 30 g, Rfl: 1    QUEtiapine (SEROQUEL) 25 MG tablet,

## 2025-05-06 NOTE — PROGRESS NOTES
[facility] PROFESSIONAL SERVS  [facility]  [address] [address]  [phone]    Zina Haas (:  1945) is a 79 y.o. female, here for evaluation of the following chief complaint(s):  Swelling (Hands and legs /X 1 week )        Assessment & Plan     ASSESSMENT/PLAN:  1. Leg edema  Clinically not c/w CHF or nephrotic syndrome. Check UA for protein. Could not give sample while in office.  Trial compression stockings.  Check daily weights.   Consider echocardiogram.  - The patient has swelling in both legs, with the right leg appearing slightly more swollen.  - Measurements of the calves and thighs were taken: left calf 38 cm, right calf 38.5 cm, right thigh 53 cm, and left thigh 51 cm.  - The patient is advised to use compression socks and monitor the swelling. She is also advised to limit salt intake.  - An echocardiogram may be considered to ensure proper heart function. If symptoms worsen before the next appointment, she should contact the office immediately.  - POCT Urinalysis no Micro    2. Nummular eczema  - triamcinolone (KENALOG) 0.1 % cream; Apply topically 2 times daily to rash on back - use for up to 10 days.  Dispense: 30 g; Refill: 1    3. Bilateral hand swelling  - The right hand appears more swollen than the left. The patient reported difficulty removing rings due to the swelling.  - ?positional. Consider venous doppler.  - Recent labs showed a normal albumin level of 3.9 g/dL (normal range 3.4-4.9 g/dL). The last urine test did not show proteinuria.  - A photograph of the hand will be taken for future comparison. A urine sample will be collected today to check for proteinuria.  - Monitoring for changes in swelling and maintaining close contact for updates.    4. 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 Increase to 25mg at hs and

## 2025-05-06 NOTE — PATIENT INSTRUCTIONS
Try compression stockings.  Limit salt.  Elevate legs when seated.  We may need to get an echocardiogram to check her heart function.  Increase the seroquel/quetiapine th 25mg at bedtime.  Call or send an update in one week. Dr. Hughes will be out of town next week, but there will be doctors here in the [place] office for appointments and doctors from [place] answering messages (Dr. Leopold and Dr. Hernandez).

## 2025-05-07 ENCOUNTER — TELEPHONE (OUTPATIENT)
Dept: FAMILY MEDICINE CLINIC | Age: 80
End: 2025-05-07

## 2025-05-07 LAB
BILIRUBIN, POC: NORMAL
BLOOD URINE, POC: NORMAL
CLARITY, POC: NORMAL
COLOR, POC: YELLOW
GLUCOSE URINE, POC: NORMAL MG/DL
KETONES, POC: NORMAL MG/DL
LEUKOCYTE EST, POC: NORMAL
NITRITE, POC: NORMAL
PH, POC: 6.5
PROTEIN, POC: NORMAL MG/DL
SPECIFIC GRAVITY, POC: 1.02
UROBILINOGEN, POC: 0.2 MG/DL

## 2025-05-07 NOTE — TELEPHONE ENCOUNTER
stopped in.  States that patient started quetiapine 25 mg 4/18/25 taking half tablet until last night when she started taking 25 mg.  She is hallucinating, having outbursts, not following any instructions at the adult day care for the past 2 day.   dropped off a urine sample this afternoon as well.       is asking if he should continue the full dose or any other suggestions.  He would like a reply through [employer].

## 2025-05-13 ENCOUNTER — TELEPHONE (OUTPATIENT)
Dept: FAMILY MEDICINE CLINIC | Age: 80
End: 2025-05-13

## 2025-05-13 NOTE — TELEPHONE ENCOUNTER
Spoke with - he believes with splitting medications to twice daily is helping - was able to make it all day at the day camp and so far today has not gotten a call.  reports that she seemed to know everyone on Sunday at the gathering

## 2025-05-13 NOTE — TELEPHONE ENCOUNTER
----- Message from Dr. Ellen Dasilva MD sent at 5/13/2025  7:19 AM EDT -----  Please call  Royce to see how Vannesa is doing - with regard to agitation and behavior and with regard to swelling. I am out of town but will check my messages later today. If he prefers, you can check with daughter, Elizabeth.  Thank you!   Dr. Hughes  ----- Message -----  From: Ellen Dasilva MD  Sent: 5/12/2025   2:00 PM EDT  To: Ellen Dasilva MD    Check on Vannesa

## 2025-05-21 ENCOUNTER — TELEPHONE (OUTPATIENT)
Dept: FAMILY MEDICINE CLINIC | Age: 80
End: 2025-05-21

## 2025-05-21 DIAGNOSIS — F03.911 AGITATION DUE TO DEMENTIA (HCC): ICD-10-CM

## 2025-05-21 RX ORDER — QUETIAPINE FUMARATE 25 MG/1
25 TABLET, FILM COATED ORAL 2 TIMES DAILY
Qty: 60 TABLET | Refills: 0 | Status: SHIPPED | OUTPATIENT
Start: 2025-05-21

## 2025-05-21 NOTE — TELEPHONE ENCOUNTER
Such a difficult situation. I assume the issues are in spite of the medication, not caused by the medication. Hard to know that for sure. Could we add her on to my schedule tomorrow? In the meantime, increase quetiapine/Seroquel to 25mg twice daily (currently at 12.5mg twice daily. Need new rx before tomorrow?

## 2025-05-21 NOTE — TELEPHONE ENCOUNTER
stopped in.  Prior to refilling Quetiapine (patient has 3 days left), patients outbursts, hallucinations and escalating fear are getting worse especially at the Adult Day Care.  The Day Care is not equipped to manage one on one.  This medication is not helping and symptoms are getting worse during the day, however, it is helping her sleep somewhat better at night (still getting up some).     Royce, spouse, would like a call back to discuss prior to refilling Quetiapine Fumarate 25 mg. Should this be refilled?  Also, how is she weaned off if need be?  Is there another medication to try?   If refilling Quetiapine, please send to [facility] [place] not mail order.    Please advise. Thank you.

## 2025-05-22 ENCOUNTER — OFFICE VISIT (OUTPATIENT)
Dept: FAMILY MEDICINE CLINIC | Age: 80
End: 2025-05-22

## 2025-05-22 VITALS
WEIGHT: 164 LBS | HEART RATE: 84 BPM | SYSTOLIC BLOOD PRESSURE: 112 MMHG | BODY MASS INDEX: 33.12 KG/M2 | OXYGEN SATURATION: 98 % | RESPIRATION RATE: 14 BRPM | DIASTOLIC BLOOD PRESSURE: 70 MMHG

## 2025-05-22 DIAGNOSIS — G30.1 MODERATE LATE ONSET ALZHEIMER'S DEMENTIA WITH AGITATION (HCC): Primary | ICD-10-CM

## 2025-05-22 DIAGNOSIS — F02.B11 MODERATE LATE ONSET ALZHEIMER'S DEMENTIA WITH AGITATION (HCC): Primary | ICD-10-CM

## 2025-05-22 NOTE — PROGRESS NOTES
[facility] PROFESSIONAL SERVS  [facility]  [address] [address]  [phone]    Zina Haas (:  1945) is a 79 y.o. female, here for evaluation of the following chief complaint(s):  Follow-up (Medications /)        Assessment & Plan     ASSESSMENT/PLAN:  1. 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 Dementia.  - Cognitive decline and agitation are likely due to the natural progression of dementia rather than a side effect of the current medication regimen.  - The possibility of underlying anxiety or depression contributing to symptoms was discussed. Decided against addition of SSRI at this time.  - Continue with the current dose of quetiapine, which can be escalated every 5 to 7 days if necessary. Provide an update on the condition by next Thursday unless any significant concerns arise in the interim.  - Potential risks and benefits of quetiapine were discussed, including the possibility of increased morbidity in older adults. This medication is used when no other alternatives are available and is considered part of comfort care.    Follow-up  The patient will follow up on 2025.    Return in about 4 weeks (around 2025) for already scheduled follow up.       There are no Patient Instructions on file for this visit.      Subjective   SUBJECTIVE/OBJECTIVE:  History of Present Illness  The patient presents for discussion of ongoing agitation and behavior problems. Started quetiapine 25, initially at 12.5mg daily. Increased to 12.5mg BID about 2 wks ago. Ongoing issues both at home and at adult day care.     Note from 25:     \" stopped in.  Prior to refilling Quetiapine (patient has 3 days left), patients outbursts, hallucinations and escalating fear are getting worse especially at the Adult Day Care.  The Day Care is not equipped to manage one on one.  This medication is not

## 2025-05-23 ENCOUNTER — TELEPHONE (OUTPATIENT)
Dept: FAMILY MEDICINE CLINIC | Age: 80
End: 2025-05-23

## 2025-05-23 DIAGNOSIS — G30.1 MODERATE LATE ONSET ALZHEIMER'S DEMENTIA WITH AGITATION (HCC): Primary | ICD-10-CM

## 2025-05-23 DIAGNOSIS — F02.B11 MODERATE LATE ONSET ALZHEIMER'S DEMENTIA WITH AGITATION (HCC): Primary | ICD-10-CM

## 2025-05-23 NOTE — TELEPHONE ENCOUNTER
Please let  know that I received his query about Dr. Maria. I do know Dr. Maria and think that consultation with him and the palliative care team would be helpful. I am putting in a referral for that.

## 2025-06-04 ENCOUNTER — TELEPHONE (OUTPATIENT)
Dept: FAMILY MEDICINE CLINIC | Age: 80
End: 2025-06-04

## 2025-06-04 NOTE — TELEPHONE ENCOUNTER
stopped in.  He thinks patient has a UTI due to her dark, smelly urine plus very confused, aggressive, angry and bizarre behavior,   was given a urine cup/hat and will return sample yet today if possible or tomorrow morning.    Declined appointment for tomorrow with Dr. Dasilva as patient has another medical appointment tomorrow.  Could a urine lab order be placed?  Please advise.  Thank you.

## 2025-06-05 ENCOUNTER — LAB (OUTPATIENT)
Dept: FAMILY MEDICINE CLINIC | Age: 80
End: 2025-06-05

## 2025-06-05 DIAGNOSIS — R39.9 UTI SYMPTOMS: Primary | ICD-10-CM

## 2025-06-05 LAB
BILIRUBIN, POC: NEGATIVE
BLOOD URINE, POC: NORMAL
CLARITY, POC: NORMAL
COLOR, POC: NORMAL
GLUCOSE URINE, POC: NEGATIVE MG/DL
KETONES, POC: NORMAL MG/DL
LEUKOCYTE EST, POC: NORMAL
NITRITE, POC: NEGATIVE
PH, POC: 6
PROTEIN, POC: NEGATIVE MG/DL
SPECIFIC GRAVITY, POC: 1.02
UROBILINOGEN, POC: 0.2 MG/DL

## 2025-06-05 PROCEDURE — 81003 URINALYSIS AUTO W/O SCOPE: CPT | Performed by: FAMILY MEDICINE

## 2025-06-06 ENCOUNTER — RESULTS FOLLOW-UP (OUTPATIENT)
Dept: FAMILY MEDICINE CLINIC | Age: 80
End: 2025-06-06

## 2025-06-06 DIAGNOSIS — N30.00 ACUTE CYSTITIS WITHOUT HEMATURIA: Primary | ICD-10-CM

## 2025-06-06 RX ORDER — CEPHALEXIN 500 MG/1
500 CAPSULE ORAL 2 TIMES DAILY
Qty: 14 CAPSULE | Refills: 0 | Status: SHIPPED | OUTPATIENT
Start: 2025-06-06 | End: 2025-06-06

## 2025-06-06 RX ORDER — CEPHALEXIN 500 MG/1
500 CAPSULE ORAL 2 TIMES DAILY
Qty: 14 CAPSULE | Refills: 0 | Status: SHIPPED | OUTPATIENT
Start: 2025-06-06 | End: 2025-06-13

## 2025-06-07 LAB
BACTERIA UR CULT: ABNORMAL
ORGANISM: ABNORMAL

## 2025-06-13 DIAGNOSIS — F03.911 AGITATION DUE TO DEMENTIA (HCC): ICD-10-CM

## 2025-06-13 RX ORDER — QUETIAPINE FUMARATE 25 MG/1
25 TABLET, FILM COATED ORAL 2 TIMES DAILY
Qty: 180 TABLET | Refills: 1 | Status: SHIPPED | OUTPATIENT
Start: 2025-06-13 | End: 2025-06-15 | Stop reason: SDUPTHER

## 2025-06-13 NOTE — TELEPHONE ENCOUNTER
Patient's last appointment was : 5/22/2025  Patient's next appointment is : 6/17/2025  Last refilled: 05/21/25 60 tab, 0 refills     Requesting 90 day

## 2025-06-15 RX ORDER — QUETIAPINE FUMARATE 25 MG/1
25 TABLET, FILM COATED ORAL 2 TIMES DAILY
Qty: 180 TABLET | Refills: 0 | Status: SHIPPED | OUTPATIENT
Start: 2025-06-15

## 2025-06-16 ENCOUNTER — TELEPHONE (OUTPATIENT)
Dept: FAMILY MEDICINE CLINIC | Age: 80
End: 2025-06-16

## 2025-06-16 NOTE — TELEPHONE ENCOUNTER
Patients , Royce, called stating pharmacy filled patients cephalexin twice. Patients  wondering if patient is to take this again, she already finished her first round and is no longer having UTI symptoms. Please advise. Thank you!

## 2025-06-19 ENCOUNTER — TELEPHONE (OUTPATIENT)
Dept: FAMILY MEDICINE CLINIC | Age: 80
End: 2025-06-19

## 2025-06-23 ENCOUNTER — OFFICE VISIT (OUTPATIENT)
Dept: PALLATIVE CARE | Age: 80
End: 2025-06-23
Payer: MEDICARE

## 2025-06-23 VITALS
SYSTOLIC BLOOD PRESSURE: 122 MMHG | RESPIRATION RATE: 15 BRPM | HEART RATE: 88 BPM | OXYGEN SATURATION: 96 % | WEIGHT: 164.2 LBS | BODY MASS INDEX: 33.1 KG/M2 | DIASTOLIC BLOOD PRESSURE: 62 MMHG | HEIGHT: 59 IN

## 2025-06-23 DIAGNOSIS — I72.0 ANEURYSM OF CAROTID ARTERY: ICD-10-CM

## 2025-06-23 DIAGNOSIS — Z51.5 PALLIATIVE CARE PATIENT: ICD-10-CM

## 2025-06-23 DIAGNOSIS — F03.90 DEMENTIA WITHOUT BEHAVIORAL DISTURBANCE, PSYCHOTIC DISTURBANCE, MOOD DISTURBANCE, OR ANXIETY, UNSPECIFIED DEMENTIA SEVERITY, UNSPECIFIED DEMENTIA TYPE (HCC): Primary | ICD-10-CM

## 2025-06-23 PROCEDURE — 1123F ACP DISCUSS/DSCN MKR DOCD: CPT | Performed by: STUDENT IN AN ORGANIZED HEALTH CARE EDUCATION/TRAINING PROGRAM

## 2025-06-23 PROCEDURE — 1159F MED LIST DOCD IN RCRD: CPT | Performed by: STUDENT IN AN ORGANIZED HEALTH CARE EDUCATION/TRAINING PROGRAM

## 2025-06-23 PROCEDURE — 99204 OFFICE O/P NEW MOD 45 MIN: CPT | Performed by: STUDENT IN AN ORGANIZED HEALTH CARE EDUCATION/TRAINING PROGRAM

## 2025-06-23 RX ORDER — ACETAMINOPHEN 500 MG
500 TABLET ORAL 3 TIMES DAILY
COMMUNITY
Start: 2025-06-23

## 2025-06-23 NOTE — PATIENT INSTRUCTIONS
Continue current plan of care for acute and chronic conditions per primary and specialist teams  Start Tylenol 500 mg every 8 hours scheduled for  pain  Continue Seroquel, Namenda and Aricept per PCP for dementia  Will look into additional resources for dementia care  POLST form completed today and reflects patient's wishes  [place] Portable DNR completed today  Please call the office if your symptoms worsen or if you were to develop new symptoms  Please call the palliative care office when you need refills

## 2025-06-23 NOTE — PROGRESS NOTES
call the office if your symptoms worsen or if you were to develop new symptoms  Please call the palliative care office when you need refills       Return in about 1 year (around 6/23/2026) for dementia.    Medications Prescribed:  Orders Placed This Encounter   Medications    acetaminophen (TYLENOL) 500 MG tablet     Sig: Take 1 tablet by mouth 3 times daily       Future Appointments   Date Time Provider Department Center   7/3/2025  2:00 PM Ellen Dasilva MD [facility]   6/15/2026  1:30 PM Rm Maria MD N [facility] [place]       Patient given educational materials - see patient instructions.  Discussed use, benefit, and side effects of prescribed medications.  All patient questions answered.  Patient voiced understanding. Patient agreed with treatment plan. Follow up as directed.         Electronically signed by Rm Maria MD on 6/23/2025 at 3:44 PM    Parts of this note may have been dictated by use of voice recognition software and electronically transcribed. The note may contain errors not detected in proofreading.

## 2025-06-30 LAB — MAMMOGRAPHY, EXTERNAL: NORMAL

## 2025-07-03 ENCOUNTER — OFFICE VISIT (OUTPATIENT)
Dept: FAMILY MEDICINE CLINIC | Age: 80
End: 2025-07-03

## 2025-07-03 VITALS
HEART RATE: 72 BPM | DIASTOLIC BLOOD PRESSURE: 66 MMHG | HEIGHT: 59 IN | RESPIRATION RATE: 16 BRPM | BODY MASS INDEX: 34.15 KG/M2 | SYSTOLIC BLOOD PRESSURE: 116 MMHG | WEIGHT: 169.4 LBS | OXYGEN SATURATION: 99 %

## 2025-07-03 DIAGNOSIS — Z79.01 ANTICOAGULATED: ICD-10-CM

## 2025-07-03 DIAGNOSIS — Z86.718 HX OF DEEP VENOUS THROMBOSIS: ICD-10-CM

## 2025-07-03 DIAGNOSIS — F02.B11 MODERATE LATE ONSET ALZHEIMER'S DEMENTIA WITH AGITATION (HCC): Primary | ICD-10-CM

## 2025-07-03 DIAGNOSIS — G30.1 MODERATE LATE ONSET ALZHEIMER'S DEMENTIA WITH AGITATION (HCC): Primary | ICD-10-CM

## 2025-07-03 DIAGNOSIS — Z91.81 HISTORY OF RECENT FALL: ICD-10-CM

## 2025-07-03 NOTE — PROGRESS NOTES
Health Maintenance Due   Topic Date Due    Hepatitis C screen  Never done    Shingles vaccine (2 of 3) 10/01/2009    Respiratory Syncytial Virus (RSV) Pregnant or age 60 yrs+ (1 - 1-dose 75+ series) Never done    DTaP/Tdap/Td vaccine (2 - Td or Tdap) 10/28/2024

## 2025-07-03 NOTE — PROGRESS NOTES
[facility]  [address] [address]  [phone]    Zina Haas (:  1945) is a 79 y.o. female, here for evaluation of the following chief complaint(s):  Follow-up (Patient presents for f/u. Royce/ would like to discuss concerns privately. / asked to have mammogram reviewed. Scanned into chart. )        Assessment & Plan     ASSESSMENT/PLAN:  1. 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 Maria/palliative care. Appreciate his input. Doing better with current dose of quetiapine. No changes made today. Respite stay at [facility] planned for next week.  planning ahead towards when nursing home placement will be needed. Continue adult day care for now.    2. Anticoagulated      3. History of recent fall      4. Hx of deep venous thrombosis      Assessment & Plan      Hx unprovoked Deep Vein Thrombosis (DVT).  - Risk-benefit analysis of continuing Eliquis discussed.  - Risk of bleeding from falls weighed against risk of another blood clot.  - Decision on Eliquis continuation pending consultation with daughter Elizabeth, who has a medical background. Spoke with nikole Johnson - plan is to continue Eliquis treatment as is for now.   - Cost of Eliquis is $75 for 90 days with insurance.    Fall.  - Experienced a fall, hitting head on shower wall; CAT scan showed no significant injuries.  - Now uses a shower chair to prevent future falls.  - Discussed potential risk of bleeding due to blood thinner.    Health Maintenance.  - Mammogram performed this week; results were normal.  - No further action required at this time.    Follow-up  - Follow-up appointment scheduled for 3 months.    Return in about 3 months (around 10/3/2025) for management.       There are no Patient Instructions on file for this visit.      Subjective   SUBJECTIVE/OBJECTIVE:  History of Present Illness  The

## 2025-07-04 ENCOUNTER — TELEPHONE (OUTPATIENT)
Dept: FAMILY MEDICINE CLINIC | Age: 80
End: 2025-07-04

## 2025-07-04 NOTE — TELEPHONE ENCOUNTER
Spoke with daughter Elizabeth - plan is to continue Eliquis treatment as is for now. Please let  Royce know - no change in meds. We had discussed pros and cons of continuation given her recent fall and her imbalance, and he wanted me to discuss with Elizabeth. Thanks!  AKILA

## 2025-08-25 ENCOUNTER — OFFICE VISIT (OUTPATIENT)
Dept: FAMILY MEDICINE CLINIC | Age: 80
End: 2025-08-25

## 2025-08-25 VITALS
SYSTOLIC BLOOD PRESSURE: 116 MMHG | OXYGEN SATURATION: 94 % | RESPIRATION RATE: 20 BRPM | DIASTOLIC BLOOD PRESSURE: 72 MMHG | HEART RATE: 100 BPM

## 2025-08-25 DIAGNOSIS — R39.9 UTI SYMPTOMS: Primary | ICD-10-CM

## 2025-08-25 LAB
BILIRUBIN, POC: NEGATIVE
BLOOD URINE, POC: NORMAL
CLARITY, POC: CLEAR
COLOR, POC: YELLOW
GLUCOSE URINE, POC: NEGATIVE MG/DL
KETONES, POC: NEGATIVE MG/DL
LEUKOCYTE EST, POC: NORMAL
NITRITE, POC: NEGATIVE
PH, POC: 7
PROTEIN, POC: NEGATIVE MG/DL
SPECIFIC GRAVITY, POC: 1.01
UROBILINOGEN, POC: 0.2 MG/DL

## 2025-08-25 RX ORDER — CEPHALEXIN 500 MG/1
500 CAPSULE ORAL 2 TIMES DAILY
Qty: 20 CAPSULE | Refills: 0 | Status: SHIPPED | OUTPATIENT
Start: 2025-08-25 | End: 2025-09-04

## 2025-08-27 LAB
BACTERIA UR CULT: ABNORMAL
ORGANISM: ABNORMAL

## 2025-08-29 ENCOUNTER — TELEPHONE (OUTPATIENT)
Dept: PALLATIVE CARE | Age: 80
End: 2025-08-29

## 2025-08-29 DIAGNOSIS — F03.911 AGITATION DUE TO DEMENTIA (HCC): ICD-10-CM
